# Patient Record
Sex: MALE | Race: WHITE | Employment: OTHER | URBAN - METROPOLITAN AREA
[De-identification: names, ages, dates, MRNs, and addresses within clinical notes are randomized per-mention and may not be internally consistent; named-entity substitution may affect disease eponyms.]

---

## 2020-05-19 PROBLEM — I25.118 CORONARY ARTERY DISEASE OF NATIVE ARTERY OF NATIVE HEART WITH STABLE ANGINA PECTORIS (HCC): Status: ACTIVE | Noted: 2020-05-19

## 2020-05-19 PROBLEM — I20.89 ANGINA OF EFFORT: Status: ACTIVE | Noted: 2020-05-19

## 2020-05-19 PROBLEM — I25.118 CORONARY ARTERY DISEASE OF NATIVE ARTERY OF NATIVE HEART WITH STABLE ANGINA PECTORIS: Status: ACTIVE | Noted: 2020-05-19

## 2020-05-19 PROBLEM — I20.8 ANGINA OF EFFORT (HCC): Status: ACTIVE | Noted: 2020-05-19

## 2020-05-19 PROBLEM — I20.89 ANGINA OF EFFORT (HCC): Status: ACTIVE | Noted: 2020-05-19

## 2020-05-23 ENCOUNTER — APPOINTMENT (OUTPATIENT)
Dept: LAB | Facility: HOSPITAL | Age: 80
End: 2020-05-23
Attending: INTERNAL MEDICINE
Payer: MEDICARE

## 2020-05-23 DIAGNOSIS — Z01.812 PRE-PROCEDURE LAB EXAM: Primary | ICD-10-CM

## 2020-05-23 DIAGNOSIS — Z01.818 PREOP TESTING: ICD-10-CM

## 2020-05-23 PROCEDURE — 85027 COMPLETE CBC AUTOMATED: CPT

## 2020-05-23 PROCEDURE — 80048 BASIC METABOLIC PNL TOTAL CA: CPT

## 2020-05-23 PROCEDURE — 36415 COLL VENOUS BLD VENIPUNCTURE: CPT

## 2020-05-25 RX ORDER — SODIUM CHLORIDE 9 MG/ML
INJECTION, SOLUTION INTRAVENOUS
Status: DISCONTINUED | OUTPATIENT
Start: 2020-05-25 | End: 2020-05-25

## 2020-05-26 ENCOUNTER — HOSPITAL ENCOUNTER (OUTPATIENT)
Dept: INTERVENTIONAL RADIOLOGY/VASCULAR | Facility: HOSPITAL | Age: 80
Discharge: HOME | End: 2020-05-26
Attending: INTERNAL MEDICINE | Admitting: INTERNAL MEDICINE
Payer: MEDICARE

## 2020-05-26 VITALS
WEIGHT: 184 LBS | OXYGEN SATURATION: 96 % | BODY MASS INDEX: 30 KG/M2 | DIASTOLIC BLOOD PRESSURE: 72 MMHG | RESPIRATION RATE: 19 BRPM | SYSTOLIC BLOOD PRESSURE: 176 MMHG | HEART RATE: 52 BPM

## 2020-05-26 DIAGNOSIS — Z01.818 PREOP TESTING: Primary | ICD-10-CM

## 2020-05-26 DIAGNOSIS — I20.0 UNSTABLE ANGINA (HCC): ICD-10-CM

## 2020-05-26 PROCEDURE — 36415 COLL VENOUS BLD VENIPUNCTURE: CPT

## 2020-05-26 PROCEDURE — 99152 MOD SED SAME PHYS/QHP 5/>YRS: CPT

## 2020-05-26 PROCEDURE — B2151ZZ FLUOROSCOPY OF LEFT HEART USING LOW OSMOLAR CONTRAST: ICD-10-PCS | Performed by: INTERNAL MEDICINE

## 2020-05-26 PROCEDURE — 93458 L HRT ARTERY/VENTRICLE ANGIO: CPT

## 2020-05-26 PROCEDURE — 027034Z DILATION OF CORONARY ARTERY, ONE ARTERY WITH DRUG-ELUTING INTRALUMINAL DEVICE, PERCUTANEOUS APPROACH: ICD-10-PCS | Performed by: INTERNAL MEDICINE

## 2020-05-26 PROCEDURE — B2111ZZ FLUOROSCOPY OF MULTIPLE CORONARY ARTERIES USING LOW OSMOLAR CONTRAST: ICD-10-PCS | Performed by: INTERNAL MEDICINE

## 2020-05-26 PROCEDURE — 4A023N7 MEASUREMENT OF CARDIAC SAMPLING AND PRESSURE, LEFT HEART, PERCUTANEOUS APPROACH: ICD-10-PCS | Performed by: INTERNAL MEDICINE

## 2020-05-26 PROCEDURE — 93010 ELECTROCARDIOGRAM REPORT: CPT | Performed by: INTERNAL MEDICINE

## 2020-05-26 PROCEDURE — 93005 ELECTROCARDIOGRAM TRACING: CPT

## 2020-05-26 PROCEDURE — 99153 MOD SED SAME PHYS/QHP EA: CPT

## 2020-05-26 PROCEDURE — 82962 GLUCOSE BLOOD TEST: CPT

## 2020-05-26 RX ORDER — CLOPIDOGREL BISULFATE 75 MG/1
75 TABLET ORAL DAILY
Qty: 30 TABLET | Refills: 11 | Status: SHIPPED | OUTPATIENT
Start: 2020-05-27 | End: 2021-05-17

## 2020-05-26 RX ORDER — LIDOCAINE HYDROCHLORIDE 20 MG/ML
INJECTION, SOLUTION EPIDURAL; INFILTRATION; INTRACAUDAL; PERINEURAL
Status: COMPLETED
Start: 2020-05-26 | End: 2020-05-26

## 2020-05-26 RX ORDER — AMLODIPINE BESYLATE 5 MG/1
5 TABLET ORAL ONCE
Status: COMPLETED | OUTPATIENT
Start: 2020-05-26 | End: 2020-05-26

## 2020-05-26 RX ORDER — LOSARTAN POTASSIUM 100 MG/1
TABLET ORAL
Status: COMPLETED
Start: 2020-05-26 | End: 2020-05-26

## 2020-05-26 RX ORDER — ASPIRIN 81 MG/1
81 TABLET ORAL DAILY
Qty: 30 TABLET | Refills: 0 | Status: SHIPPED | OUTPATIENT
Start: 2020-06-27 | End: 2020-06-02 | Stop reason: DRUGHIGH

## 2020-05-26 RX ORDER — ASPIRIN 81 MG/1
81 TABLET ORAL DAILY
Status: DISCONTINUED | OUTPATIENT
Start: 2020-05-27 | End: 2020-05-26

## 2020-05-26 RX ORDER — CLOPIDOGREL BISULFATE 75 MG/1
75 TABLET ORAL DAILY
Status: DISCONTINUED | OUTPATIENT
Start: 2020-05-27 | End: 2020-05-26

## 2020-05-26 RX ORDER — MIDAZOLAM HYDROCHLORIDE 1 MG/ML
INJECTION INTRAMUSCULAR; INTRAVENOUS
Status: COMPLETED
Start: 2020-05-26 | End: 2020-05-26

## 2020-05-26 RX ORDER — AMLODIPINE BESYLATE 5 MG/1
TABLET ORAL
Status: COMPLETED
Start: 2020-05-26 | End: 2020-05-26

## 2020-05-26 RX ORDER — SODIUM CHLORIDE 9 MG/ML
INJECTION, SOLUTION INTRAVENOUS CONTINUOUS
Status: ACTIVE | OUTPATIENT
Start: 2020-05-26 | End: 2020-05-26

## 2020-05-26 RX ORDER — CLOPIDOGREL BISULFATE 75 MG/1
TABLET ORAL
Status: COMPLETED
Start: 2020-05-26 | End: 2020-05-26

## 2020-05-26 RX ORDER — SODIUM CHLORIDE 9 MG/ML
1 INJECTION, SOLUTION INTRAVENOUS CONTINUOUS
Status: DISCONTINUED | OUTPATIENT
Start: 2020-05-26 | End: 2020-05-26

## 2020-05-26 RX ORDER — LOSARTAN POTASSIUM 100 MG/1
100 TABLET ORAL ONCE
Status: COMPLETED | OUTPATIENT
Start: 2020-05-26 | End: 2020-05-26

## 2020-05-26 RX ADMIN — LOSARTAN POTASSIUM 100 MG: 100 TABLET ORAL at 14:24:00

## 2020-05-26 RX ADMIN — AMLODIPINE BESYLATE 5 MG: 5 TABLET ORAL at 13:11:00

## 2020-05-26 RX ADMIN — SODIUM CHLORIDE 1 ML/KG/HR: 9 INJECTION, SOLUTION INTRAVENOUS at 06:30:00

## 2020-05-26 RX ADMIN — SODIUM CHLORIDE: 9 INJECTION, SOLUTION INTRAVENOUS at 09:00:00

## 2020-05-26 NOTE — PROGRESS NOTES
Outpatient Surgery Brief Discharge Summary         Patient ID:  Vamsi Post  [id number]  79year old  5/3/1940    Discharge Diagnoses: cad  Procedures: l,lv,cor; pci lad  Discharged Condition: stable    Disposition: home    Patient Instructions:  Tc Dent

## 2020-05-26 NOTE — CARDIAC REHAB
Cardiac Rehab Phase I    Activity:  Distance Bedrest post procedure  Assistance needed   Patient tolerated activity . Education:  Handouts provided and reviewed: [address] Diet: Healthy Cardiac diet reviewed.     Disease Proces

## 2020-05-26 NOTE — PROCEDURES
PCI of the LAD  Lesion Characteristics-not torturous, mild calcified. Type non-C lesion. Pre-intervention stenosis 75 %, Post intervention stenosis 0%. Pre KENYA 3, Post KENYA 3.    Guide-JL4  Wire-patient was premedicated with Angiomax.   A [employer] wire was a

## 2020-05-26 NOTE — DIETARY NOTE
NUTRITION EDUCATION NOTE    Received consult for nutrition education per cardiac rehab order set. Appropriate education completed with pt and pt's daughter and handout(s) provided. See education section of Epic for specifics.     Abdirashid Pride RD,LDN

## 2020-05-26 NOTE — PROCEDURES
[facility] Cardiac Cath Procedure Note    Luis Fernando Mckeon Patient Status:  Outpatient in a Bed    5/3/1940 MRN [id number]   Location [facility] Attending Trinity Keating MD   Hosp Day # 0 PCP Angela Ryan, projections. Pigtail catheter was placed for LV hemodynamics and LV angiogram was performed.     Specimen sent to: No specimen collected  Estimated blood loss: 10 cc  Closure: Mynx      IV was maintained by RN and moderate conscious sedation of 2 mg ve

## 2020-05-26 NOTE — PROGRESS NOTES
1400: Dr Louis Eddy at bedside. Updated on blood pressure situation. Ordered to give home dose of losartan. Administrated. OK for discharge. All parties verbalized understanding of plan of care via teach back method.  At discharge vital signs were stable on room a

## 2021-08-02 PROBLEM — Z01.818 PREOPERATIVE EVALUATION TO RULE OUT SURGICAL CONTRAINDICATION: Status: ACTIVE | Noted: 2021-08-02

## 2021-09-28 RX ORDER — ASPIRIN 81 MG/1
81 TABLET ORAL DAILY
Status: ON HOLD | COMMUNITY
End: 2021-10-18

## 2021-10-12 NOTE — H&P (VIEW-ONLY)
Patient ID: Latasha Nieves     Chief Complaint:    Patient presents with:  Pre-Op: pre op right total knee 10/18        HPI:    Latasha Nieves is a pleasant [de-identified]year old who presents today for evaluation of their right knee.   No acute falls, injur Transportation (Medical): Not on file      Lack of Transportation (Non-Medical):  Not on file  Physical Activity:       Days of Exercise per Week: Not on file      Minutes of Exercise per Session: Not on file  Stress:       Feeling of Stress : Not on file grind +  Sensation grossly intact to light tough throughout the lower extremity  Skin is intact  Distal pulses are 2+  No signs or symptoms of DVT    Exam of the contralateral knee is normal:  No muscle atrophy, erythema, ecchymosis, or gross deformity not Essie with right knee arthritis. At this time, they have failed a conservative treatment program. non steroid anti-infammatory medication(s), physical therapy, corticosteroid injection(s) and hyaluronic acid injection(s) are no longer working.   Their sym of this planned course of care, all their questions were answered and consent will be obtained preoperatively.       no smoking  yes diabetes A1C  6.8  BMI - 30  no hx of blood clots   no blood thinner  last injection - > 3 mo ago       - risks, benefits an

## 2021-10-15 ENCOUNTER — LAB ENCOUNTER (OUTPATIENT)
Dept: LAB | Age: 81
End: 2021-10-15
Attending: ORTHOPAEDIC SURGERY
Payer: MEDICARE

## 2021-10-15 DIAGNOSIS — M17.11 OSTEOARTHRITIS OF RIGHT KNEE: ICD-10-CM

## 2021-10-18 ENCOUNTER — ANESTHESIA (OUTPATIENT)
Dept: SURGERY | Facility: HOSPITAL | Age: 81
End: 2021-10-18
Payer: MEDICARE

## 2021-10-18 ENCOUNTER — HOSPITAL ENCOUNTER (OUTPATIENT)
Facility: HOSPITAL | Age: 81
Discharge: HOME HEALTH | End: 2021-10-19
Attending: ORTHOPAEDIC SURGERY | Admitting: ORTHOPAEDIC SURGERY
Payer: MEDICARE

## 2021-10-18 ENCOUNTER — ANESTHESIA EVENT (OUTPATIENT)
Dept: SURGERY | Facility: HOSPITAL | Age: 81
End: 2021-10-18
Payer: MEDICARE

## 2021-10-18 ENCOUNTER — APPOINTMENT (OUTPATIENT)
Dept: GENERAL RADIOLOGY | Facility: HOSPITAL | Age: 81
End: 2021-10-18
Attending: ORTHOPAEDIC SURGERY
Payer: MEDICARE

## 2021-10-18 DIAGNOSIS — M17.11 OSTEOARTHRITIS OF RIGHT KNEE: Primary | ICD-10-CM

## 2021-10-18 DIAGNOSIS — I25.118 CORONARY ARTERY DISEASE OF NATIVE ARTERY OF NATIVE HEART WITH STABLE ANGINA PECTORIS (HCC): ICD-10-CM

## 2021-10-18 DIAGNOSIS — M17.11 PRIMARY OSTEOARTHRITIS OF RIGHT KNEE: ICD-10-CM

## 2021-10-18 PROCEDURE — 88305 TISSUE EXAM BY PATHOLOGIST: CPT | Performed by: ORTHOPAEDIC SURGERY

## 2021-10-18 PROCEDURE — 3E0T3BZ INTRODUCTION OF ANESTHETIC AGENT INTO PERIPHERAL NERVES AND PLEXI, PERCUTANEOUS APPROACH: ICD-10-PCS | Performed by: ANESTHESIOLOGY

## 2021-10-18 PROCEDURE — 86850 RBC ANTIBODY SCREEN: CPT | Performed by: ORTHOPAEDIC SURGERY

## 2021-10-18 PROCEDURE — 73560 X-RAY EXAM OF KNEE 1 OR 2: CPT | Performed by: ORTHOPAEDIC SURGERY

## 2021-10-18 PROCEDURE — 82962 GLUCOSE BLOOD TEST: CPT

## 2021-10-18 PROCEDURE — 36410 VNPNXR 3YR/> PHY/QHP DX/THER: CPT | Performed by: ORTHOPAEDIC SURGERY

## 2021-10-18 PROCEDURE — 0SRC0J9 REPLACEMENT OF RIGHT KNEE JOINT WITH SYNTHETIC SUBSTITUTE, CEMENTED, OPEN APPROACH: ICD-10-PCS | Performed by: ORTHOPAEDIC SURGERY

## 2021-10-18 PROCEDURE — 3E0T33Z INTRODUCTION OF ANTI-INFLAMMATORY INTO PERIPHERAL NERVES AND PLEXI, PERCUTANEOUS APPROACH: ICD-10-PCS | Performed by: ANESTHESIOLOGY

## 2021-10-18 PROCEDURE — 76937 US GUIDE VASCULAR ACCESS: CPT | Performed by: ORTHOPAEDIC SURGERY

## 2021-10-18 PROCEDURE — 88311 DECALCIFY TISSUE: CPT | Performed by: ORTHOPAEDIC SURGERY

## 2021-10-18 PROCEDURE — 83036 HEMOGLOBIN GLYCOSYLATED A1C: CPT | Performed by: INTERNAL MEDICINE

## 2021-10-18 PROCEDURE — 86901 BLOOD TYPING SEROLOGIC RH(D): CPT | Performed by: ORTHOPAEDIC SURGERY

## 2021-10-18 PROCEDURE — 86900 BLOOD TYPING SEROLOGIC ABO: CPT | Performed by: ORTHOPAEDIC SURGERY

## 2021-10-18 PROCEDURE — 76942 ECHO GUIDE FOR BIOPSY: CPT | Performed by: ANESTHESIOLOGY

## 2021-10-18 DEVICE — IMPLANTABLE DEVICE
Type: IMPLANTABLE DEVICE | Site: KNEE | Status: FUNCTIONAL
Brand: REFOBACIN® BONE CEMENT R

## 2021-10-18 DEVICE — PSN TIB STM 5 DEG SZ F R: Type: IMPLANTABLE DEVICE | Site: KNEE | Status: FUNCTIONAL

## 2021-10-18 DEVICE — PSN MC VE ASF R 13MM 8-11 EF: Type: IMPLANTABLE DEVICE | Site: KNEE | Status: FUNCTIONAL

## 2021-10-18 DEVICE — PSN FEM CR CMT CCR STD SZ10 R: Type: IMPLANTABLE DEVICE | Site: KNEE | Status: FUNCTIONAL

## 2021-10-18 DEVICE — PSN ALL POLY PAT PLY 32MM: Type: IMPLANTABLE DEVICE | Site: KNEE | Status: FUNCTIONAL

## 2021-10-18 RX ORDER — HYDROCODONE BITARTRATE AND ACETAMINOPHEN 5; 325 MG/1; MG/1
1 TABLET ORAL AS NEEDED
Status: DISCONTINUED | OUTPATIENT
Start: 2021-10-18 | End: 2021-10-18 | Stop reason: HOSPADM

## 2021-10-18 RX ORDER — HYDROCODONE BITARTRATE AND ACETAMINOPHEN 5; 325 MG/1; MG/1
2 TABLET ORAL AS NEEDED
Status: DISCONTINUED | OUTPATIENT
Start: 2021-10-18 | End: 2021-10-18 | Stop reason: HOSPADM

## 2021-10-18 RX ORDER — AMLODIPINE BESYLATE 5 MG/1
5 TABLET ORAL DAILY
Status: DISCONTINUED | OUTPATIENT
Start: 2021-10-18 | End: 2021-10-18

## 2021-10-18 RX ORDER — TRANEXAMIC ACID 10 MG/ML
1000 INJECTION, SOLUTION INTRAVENOUS ONCE
Status: COMPLETED | OUTPATIENT
Start: 2021-10-18 | End: 2021-10-18

## 2021-10-18 RX ORDER — MIDAZOLAM HYDROCHLORIDE 1 MG/ML
INJECTION INTRAMUSCULAR; INTRAVENOUS AS NEEDED
Status: DISCONTINUED | OUTPATIENT
Start: 2021-10-18 | End: 2021-10-18 | Stop reason: SURG

## 2021-10-18 RX ORDER — SODIUM CHLORIDE, SODIUM LACTATE, POTASSIUM CHLORIDE, CALCIUM CHLORIDE 600; 310; 30; 20 MG/100ML; MG/100ML; MG/100ML; MG/100ML
INJECTION, SOLUTION INTRAVENOUS CONTINUOUS
Status: DISCONTINUED | OUTPATIENT
Start: 2021-10-18 | End: 2021-10-18 | Stop reason: HOSPADM

## 2021-10-18 RX ORDER — PIOGLITAZONEHYDROCHLORIDE 15 MG/1
15 TABLET ORAL DAILY
COMMUNITY

## 2021-10-18 RX ORDER — POLYETHYLENE GLYCOL 3350 17 G/17G
17 POWDER, FOR SOLUTION ORAL DAILY PRN
Status: DISCONTINUED | OUTPATIENT
Start: 2021-10-18 | End: 2021-10-19

## 2021-10-18 RX ORDER — BUPIVACAINE HYDROCHLORIDE 2.5 MG/ML
INJECTION, SOLUTION EPIDURAL; INFILTRATION; INTRACAUDAL AS NEEDED
Status: DISCONTINUED | OUTPATIENT
Start: 2021-10-18 | End: 2021-10-18 | Stop reason: SURG

## 2021-10-18 RX ORDER — ACETAMINOPHEN 500 MG
1000 TABLET ORAL ONCE
Status: DISCONTINUED | OUTPATIENT
Start: 2021-10-18 | End: 2021-10-18

## 2021-10-18 RX ORDER — HYDROMORPHONE HYDROCHLORIDE 1 MG/ML
0.2 INJECTION, SOLUTION INTRAMUSCULAR; INTRAVENOUS; SUBCUTANEOUS EVERY 2 HOUR PRN
Status: DISCONTINUED | OUTPATIENT
Start: 2021-10-18 | End: 2021-10-19

## 2021-10-18 RX ORDER — NALOXONE HYDROCHLORIDE 0.4 MG/ML
80 INJECTION, SOLUTION INTRAMUSCULAR; INTRAVENOUS; SUBCUTANEOUS AS NEEDED
Status: DISCONTINUED | OUTPATIENT
Start: 2021-10-18 | End: 2021-10-18 | Stop reason: HOSPADM

## 2021-10-18 RX ORDER — PREGABALIN 75 MG/1
75 CAPSULE ORAL DAILY
Status: DISCONTINUED | OUTPATIENT
Start: 2021-10-19 | End: 2021-10-19

## 2021-10-18 RX ORDER — SODIUM CHLORIDE, SODIUM LACTATE, POTASSIUM CHLORIDE, CALCIUM CHLORIDE 600; 310; 30; 20 MG/100ML; MG/100ML; MG/100ML; MG/100ML
INJECTION, SOLUTION INTRAVENOUS CONTINUOUS
Status: DISCONTINUED | OUTPATIENT
Start: 2021-10-18 | End: 2021-10-19

## 2021-10-18 RX ORDER — BUPIVACAINE HYDROCHLORIDE 7.5 MG/ML
INJECTION, SOLUTION INTRASPINAL AS NEEDED
Status: DISCONTINUED | OUTPATIENT
Start: 2021-10-18 | End: 2021-10-18 | Stop reason: SURG

## 2021-10-18 RX ORDER — ATORVASTATIN CALCIUM 40 MG/1
80 TABLET, FILM COATED ORAL NIGHTLY
Status: DISCONTINUED | OUTPATIENT
Start: 2021-10-18 | End: 2021-10-19

## 2021-10-18 RX ORDER — ASPIRIN 81 MG/1
81 TABLET ORAL 2 TIMES DAILY
Status: DISCONTINUED | OUTPATIENT
Start: 2021-10-18 | End: 2021-10-19

## 2021-10-18 RX ORDER — ACETAMINOPHEN 325 MG/1
650 TABLET ORAL 4 TIMES DAILY
Status: DISCONTINUED | OUTPATIENT
Start: 2021-10-18 | End: 2021-10-19

## 2021-10-18 RX ORDER — HYDROMORPHONE HYDROCHLORIDE 1 MG/ML
INJECTION, SOLUTION INTRAMUSCULAR; INTRAVENOUS; SUBCUTANEOUS
Status: COMPLETED
Start: 2021-10-18 | End: 2021-10-18

## 2021-10-18 RX ORDER — DIPHENHYDRAMINE HYDROCHLORIDE 50 MG/ML
25 INJECTION INTRAMUSCULAR; INTRAVENOUS ONCE AS NEEDED
Status: ACTIVE | OUTPATIENT
Start: 2021-10-18 | End: 2021-10-18

## 2021-10-18 RX ORDER — DEXAMETHASONE SODIUM PHOSPHATE 10 MG/ML
INJECTION, SOLUTION INTRAMUSCULAR; INTRAVENOUS AS NEEDED
Status: DISCONTINUED | OUTPATIENT
Start: 2021-10-18 | End: 2021-10-18 | Stop reason: SURG

## 2021-10-18 RX ORDER — SODIUM PHOSPHATE, DIBASIC AND SODIUM PHOSPHATE, MONOBASIC 7; 19 G/133ML; G/133ML
1 ENEMA RECTAL ONCE AS NEEDED
Status: DISCONTINUED | OUTPATIENT
Start: 2021-10-18 | End: 2021-10-19

## 2021-10-18 RX ORDER — CEFAZOLIN SODIUM/WATER 2 G/20 ML
2 SYRINGE (ML) INTRAVENOUS EVERY 8 HOURS
Status: COMPLETED | OUTPATIENT
Start: 2021-10-18 | End: 2021-10-19

## 2021-10-18 RX ORDER — ONDANSETRON 2 MG/ML
4 INJECTION INTRAMUSCULAR; INTRAVENOUS EVERY 4 HOURS PRN
Status: DISCONTINUED | OUTPATIENT
Start: 2021-10-18 | End: 2021-10-19

## 2021-10-18 RX ORDER — ONDANSETRON 2 MG/ML
INJECTION INTRAMUSCULAR; INTRAVENOUS AS NEEDED
Status: DISCONTINUED | OUTPATIENT
Start: 2021-10-18 | End: 2021-10-18 | Stop reason: SURG

## 2021-10-18 RX ORDER — MELATONIN
325
Status: DISCONTINUED | OUTPATIENT
Start: 2021-10-19 | End: 2021-10-19

## 2021-10-18 RX ORDER — ACETAMINOPHEN 325 MG/1
650 TABLET ORAL ONCE
Status: COMPLETED | OUTPATIENT
Start: 2021-10-18 | End: 2021-10-18

## 2021-10-18 RX ORDER — DIPHENHYDRAMINE HCL 25 MG
25 CAPSULE ORAL EVERY 4 HOURS PRN
Status: DISCONTINUED | OUTPATIENT
Start: 2021-10-18 | End: 2021-10-19

## 2021-10-18 RX ORDER — DOCUSATE SODIUM 100 MG/1
100 CAPSULE, LIQUID FILLED ORAL 2 TIMES DAILY
Status: DISCONTINUED | OUTPATIENT
Start: 2021-10-18 | End: 2021-10-19

## 2021-10-18 RX ORDER — AMLODIPINE BESYLATE 5 MG/1
5 TABLET ORAL DAILY
Status: DISCONTINUED | OUTPATIENT
Start: 2021-10-19 | End: 2021-10-19

## 2021-10-18 RX ORDER — DIPHENHYDRAMINE HYDROCHLORIDE 50 MG/ML
12.5 INJECTION INTRAMUSCULAR; INTRAVENOUS EVERY 4 HOURS PRN
Status: DISCONTINUED | OUTPATIENT
Start: 2021-10-18 | End: 2021-10-19

## 2021-10-18 RX ORDER — CEFAZOLIN SODIUM/WATER 2 G/20 ML
2 SYRINGE (ML) INTRAVENOUS ONCE
Status: COMPLETED | OUTPATIENT
Start: 2021-10-18 | End: 2021-10-18

## 2021-10-18 RX ORDER — DEXTROSE MONOHYDRATE 25 G/50ML
50 INJECTION, SOLUTION INTRAVENOUS
Status: DISCONTINUED | OUTPATIENT
Start: 2021-10-18 | End: 2021-10-18 | Stop reason: HOSPADM

## 2021-10-18 RX ORDER — BISACODYL 10 MG
10 SUPPOSITORY, RECTAL RECTAL
Status: DISCONTINUED | OUTPATIENT
Start: 2021-10-18 | End: 2021-10-19

## 2021-10-18 RX ORDER — DEXAMETHASONE SODIUM PHOSPHATE 10 MG/ML
8 INJECTION, SOLUTION INTRAMUSCULAR; INTRAVENOUS ONCE
Status: COMPLETED | OUTPATIENT
Start: 2021-10-19 | End: 2021-10-19

## 2021-10-18 RX ORDER — ACETAMINOPHEN 325 MG/1
TABLET ORAL
Status: COMPLETED
Start: 2021-10-18 | End: 2021-10-18

## 2021-10-18 RX ORDER — DEXAMETHASONE SODIUM PHOSPHATE 4 MG/ML
VIAL (ML) INJECTION AS NEEDED
Status: DISCONTINUED | OUTPATIENT
Start: 2021-10-18 | End: 2021-10-18 | Stop reason: SURG

## 2021-10-18 RX ORDER — LOSARTAN POTASSIUM 100 MG/1
100 TABLET ORAL DAILY
Status: DISCONTINUED | OUTPATIENT
Start: 2021-10-19 | End: 2021-10-19

## 2021-10-18 RX ORDER — HYDROMORPHONE HYDROCHLORIDE 1 MG/ML
0.4 INJECTION, SOLUTION INTRAMUSCULAR; INTRAVENOUS; SUBCUTANEOUS EVERY 2 HOUR PRN
Status: DISCONTINUED | OUTPATIENT
Start: 2021-10-18 | End: 2021-10-19

## 2021-10-18 RX ORDER — SODIUM CHLORIDE 9 MG/ML
INJECTION, SOLUTION INTRAVENOUS CONTINUOUS
Status: DISCONTINUED | OUTPATIENT
Start: 2021-10-18 | End: 2021-10-19

## 2021-10-18 RX ORDER — LIDOCAINE HYDROCHLORIDE 10 MG/ML
INJECTION, SOLUTION EPIDURAL; INFILTRATION; INTRACAUDAL; PERINEURAL AS NEEDED
Status: DISCONTINUED | OUTPATIENT
Start: 2021-10-18 | End: 2021-10-18 | Stop reason: SURG

## 2021-10-18 RX ORDER — OXYCODONE HYDROCHLORIDE 5 MG/1
2.5 TABLET ORAL EVERY 4 HOURS PRN
Status: DISCONTINUED | OUTPATIENT
Start: 2021-10-18 | End: 2021-10-19

## 2021-10-18 RX ORDER — AMLODIPINE BESYLATE 5 MG/1
5 TABLET ORAL ONCE
Status: COMPLETED | OUTPATIENT
Start: 2021-10-18 | End: 2021-10-18

## 2021-10-18 RX ORDER — FERROUS SULFATE TAB EC 324 MG (65 MG FE EQUIVALENT) 324 (65 FE) MG
1 TABLET DELAYED RESPONSE ORAL DAILY
COMMUNITY

## 2021-10-18 RX ORDER — PROCHLORPERAZINE EDISYLATE 5 MG/ML
10 INJECTION INTRAMUSCULAR; INTRAVENOUS EVERY 6 HOURS PRN
Status: DISCONTINUED | OUTPATIENT
Start: 2021-10-18 | End: 2021-10-19

## 2021-10-18 RX ORDER — TRAMADOL HYDROCHLORIDE 50 MG/1
50 TABLET ORAL EVERY 12 HOURS
Status: DISCONTINUED | OUTPATIENT
Start: 2021-10-18 | End: 2021-10-19

## 2021-10-18 RX ORDER — OXYCODONE HYDROCHLORIDE 5 MG/1
5 TABLET ORAL EVERY 4 HOURS PRN
Status: DISCONTINUED | OUTPATIENT
Start: 2021-10-18 | End: 2021-10-19

## 2021-10-18 RX ORDER — SENNOSIDES 8.6 MG
17.2 TABLET ORAL NIGHTLY
Status: DISCONTINUED | OUTPATIENT
Start: 2021-10-18 | End: 2021-10-19

## 2021-10-18 RX ORDER — HYDROMORPHONE HYDROCHLORIDE 1 MG/ML
0.4 INJECTION, SOLUTION INTRAMUSCULAR; INTRAVENOUS; SUBCUTANEOUS EVERY 5 MIN PRN
Status: DISCONTINUED | OUTPATIENT
Start: 2021-10-18 | End: 2021-10-18 | Stop reason: HOSPADM

## 2021-10-18 RX ORDER — DEXTROSE MONOHYDRATE 25 G/50ML
50 INJECTION, SOLUTION INTRAVENOUS
Status: DISCONTINUED | OUTPATIENT
Start: 2021-10-18 | End: 2021-10-19

## 2021-10-18 RX ADMIN — LIDOCAINE HYDROCHLORIDE 25 MG: 10 INJECTION, SOLUTION EPIDURAL; INFILTRATION; INTRACAUDAL; PERINEURAL at 14:23:00

## 2021-10-18 RX ADMIN — ONDANSETRON 4 MG: 2 INJECTION INTRAMUSCULAR; INTRAVENOUS at 14:29:00

## 2021-10-18 RX ADMIN — SODIUM CHLORIDE, SODIUM LACTATE, POTASSIUM CHLORIDE, CALCIUM CHLORIDE: 600; 310; 30; 20 INJECTION, SOLUTION INTRAVENOUS at 14:12:00

## 2021-10-18 RX ADMIN — BUPIVACAINE HYDROCHLORIDE 1.6 ML: 7.5 INJECTION, SOLUTION INTRASPINAL at 14:18:00

## 2021-10-18 RX ADMIN — CEFAZOLIN SODIUM/WATER 2 G: 2 G/20 ML SYRINGE (ML) INTRAVENOUS at 14:26:00

## 2021-10-18 RX ADMIN — DEXAMETHASONE SODIUM PHOSPHATE 2 MG: 10 INJECTION, SOLUTION INTRAMUSCULAR; INTRAVENOUS at 14:21:00

## 2021-10-18 RX ADMIN — BUPIVACAINE HYDROCHLORIDE 20 ML: 2.5 INJECTION, SOLUTION EPIDURAL; INFILTRATION; INTRACAUDAL at 14:21:00

## 2021-10-18 RX ADMIN — TRANEXAMIC ACID 1000 MG: 10 INJECTION, SOLUTION INTRAVENOUS at 14:25:00

## 2021-10-18 RX ADMIN — MIDAZOLAM HYDROCHLORIDE 2 MG: 1 INJECTION INTRAMUSCULAR; INTRAVENOUS at 14:12:00

## 2021-10-18 RX ADMIN — DEXAMETHASONE SODIUM PHOSPHATE 8 MG: 4 MG/ML VIAL (ML) INJECTION at 14:29:00

## 2021-10-18 NOTE — ANESTHESIA PROCEDURE NOTES
Spinal Block  Performed by: Mildred Dietz MD  Authorized by: Mildred Dietz MD       General Information and Staff    Start Time:  10/18/2021 2:18 PM  End Time:  10/18/2021 2:20 PM  Anesthesiologist:  Mildred Dietz MD  Performed by:   Anesthes

## 2021-10-18 NOTE — OPERATIVE REPORT
OPERATIVE REPORT    Facility: [facility]  Patient name: Daija Mishra  : 5/3/1940        Medical record: [id number]  Date of procedure: 2021  Pre-operative diagnosis: Right knee end-stage degenerative joint disease  Post-operative quadriceps pain/dysfunction post operatively. A surgical timeout was held verifying the site, procedure, and that pre-operative antibiotics had been given.   A longitudinal incision was over the anterior aspect of the knee exposing the extensor mechanis this time, the tibial cut was checked with the spacer block and the alignment ranjith. The distal end of the femur was sculptured with the notch and chamfer cutting guide using an oscillating saw.  The tibial fixation hole was created with the tibial templat lavage and antibiotic solution followed by normal saline. The arthrotomy was closed with #1 stratafix barbed suture. The subcutaneous tissue was closed in layers with # 0 and # 2-0 Vicryl interrupted sutures.  The skin was closed with 3-0 stratfix barbe

## 2021-10-18 NOTE — HOME CARE LIAISON
Pt was set up preoperatively with [facility]. Will continue to follow. CONY/Sunita fletcher.

## 2021-10-18 NOTE — INTERVAL H&P NOTE
Pre-op Diagnosis: Primary osteoarthritis of right knee [M17.11]    The above referenced H&P was reviewed by Lang Mohr MD on 10/18/2021, the patient was examined and no significant changes have occurred in the patient's condition since the H&P was perf

## 2021-10-18 NOTE — ANESTHESIA PROCEDURE NOTES
Regional Block  Performed by: Cecilia Perez MD  Authorized by: Cecilia Perez MD       General Information and Staff    Start Time:  10/18/2021 2:21 PM  End Time:  10/18/2021 2:22 PM  Anesthesiologist:  Cecilia Perez MD  Performed by:   Anesth

## 2021-10-18 NOTE — PLAN OF CARE
Pt A & O x4, [facility] speaking. On 2L oxygen, /IS. Teds/SCDs. Regular diet, Accu checks ACHS. WBAT. PT/OT eval tomorrow, IVF. Last BM 10/18. Tele-SB. Aquacel dressing CDI, spandagrip, ice therapy. Daughter at bedside, translating for pt.   Oxy PRN

## 2021-10-18 NOTE — ANESTHESIA PREPROCEDURE EVALUATION
PRE-OP EVALUATION    Patient Name: Vamsi Post    Admit Diagnosis: Primary osteoarthritis of right knee [M17.11]    Pre-op Diagnosis: Primary osteoarthritis of right knee [M17.11]    RIGHT TOTAL KNEE ARTHROPLASTY     Anesthesia Procedure: RIGHT TOTAL Tab, Take 2 mg by mouth daily with breakfast., Disp: , Rfl: , 10/17/2021 at 0800  amLODIPine Besylate 5 MG Oral Tab, Take 5 mg by mouth daily. , Disp: , Rfl: , 10/18/2021 at 0500  hydrochlorothiazide 25 MG Oral Tab, Take 25 mg by mouth daily. , Disp: , Rfl: GI/Hepatic/Renal    Negative GI/hepatic/renal ROS. Cardiovascular    Negative cardiovascular ROS.     Exercise tolerance: good     MET: >4      (+) hypertension     (+) CAD    (+) CABG/stent                            Endo/Other

## 2021-10-19 VITALS
TEMPERATURE: 97 F | HEIGHT: 66 IN | OXYGEN SATURATION: 92 % | BODY MASS INDEX: 29.58 KG/M2 | WEIGHT: 184.06 LBS | SYSTOLIC BLOOD PRESSURE: 143 MMHG | RESPIRATION RATE: 18 BRPM | DIASTOLIC BLOOD PRESSURE: 57 MMHG | HEART RATE: 68 BPM

## 2021-10-19 PROCEDURE — 82962 GLUCOSE BLOOD TEST: CPT

## 2021-10-19 PROCEDURE — 97161 PT EVAL LOW COMPLEX 20 MIN: CPT

## 2021-10-19 PROCEDURE — 97116 GAIT TRAINING THERAPY: CPT

## 2021-10-19 PROCEDURE — 97535 SELF CARE MNGMENT TRAINING: CPT

## 2021-10-19 PROCEDURE — 97165 OT EVAL LOW COMPLEX 30 MIN: CPT

## 2021-10-19 RX ORDER — AMLODIPINE BESYLATE 5 MG/1
10 TABLET ORAL DAILY
Qty: 30 TABLET | Refills: 0 | Status: SHIPPED | OUTPATIENT
Start: 2021-10-19

## 2021-10-19 RX ORDER — AMLODIPINE BESYLATE 5 MG/1
5 TABLET ORAL ONCE
Status: COMPLETED | OUTPATIENT
Start: 2021-10-19 | End: 2021-10-19

## 2021-10-19 NOTE — PHYSICAL THERAPY NOTE
PHYSICAL THERAPY KNEE EVALUATION - INPATIENT     Room Number: 376/376-A  Evaluation Date: 10/19/2021  Type of Evaluation: Initial  Physician Order: PT Eval and Treat    Presenting Problem: s/p R TKR  Reason for Therapy: Mobility Dysfunction and Discharge P ROM is within functional limits R knee 15-85.     Lower extremity strength is within functional limits R knee 3-/5 throughout    BALANCE  Static Sitting: Good  Dynamic Sitting: Fair +  Static Standing: Fair  Dynamic Standing: Fair - of session/findings; All patient questions and concerns addressed; Ice applied; Family present    ASSESSMENT   Patient is a [de-identified]year old male admitted on 10/18/2021 for R TKR. Pertinent comorbidities and personal factors impacting therapy include see PMHx.   I No

## 2021-10-19 NOTE — PLAN OF CARE
Pt a&O. On room air. Encouraged use of incentive spirometer and ankle pump exercises every hour while awake. Scds to BLE. Spand- to RLE. Tolerating diet with good appetite. Blood sugars monitored and insulin given. Last BM 10/18/21.  Miralax given this

## 2021-10-19 NOTE — PLAN OF CARE
Aox4, [place] w/o HA's, denying numbness and tingling at this time, pain controlled with PO medications, aquacel and spandigrip c/d/I, on 2L O2, voided, scripts at home, IVF infusing as ordered, on Aspirin, fall precautions maintained, will continue to monitor

## 2021-10-19 NOTE — OCCUPATIONAL THERAPY NOTE
OCCUPATIONAL THERAPY EVALUATION - INPATIENT     Room Number: 376/376-A  Evaluation Date: 10/19/2021  Type of Evaluation: Initial  Presenting Problem: s/p R TKR on 10/18     Physician Order: IP Consult to Occupational Therapy  Reason for Therapy: ADL/IADL D promotion;Relaxation;Repositioning; Other (Comment) (ice )    COGNITION  Safety Judgement:  decreased awareness of need for assistance and decreased awareness of need for safety   Increased time for processing even with translation     VISION  Current Visio aspects of toileting including annalise-care and clothing management with min assist. Pt performed functional mobility with RW and CG assist to chair with CG assist for stand>sit. Pt was left with call light, telephone and personal items within reach.  All ques training;Patient/Family education;Patient/Family training;Equipment eval/education; Compensatory technique education;Continued evaluation  Rehab Potential : Good  Frequency (Obs): Daily  Number of Visits to Meet Established Goals: 2    ADL Goals   Patient w

## 2021-10-19 NOTE — CM/SW NOTE
10/19/21 1400   CM/SW Referral Data   Referral Source Social Work (self-referral)   Reason for Referral Discharge planning   Informant Clinical Staff Member;EMR   Discharge Needs   Anticipated D/C needs Home health care       HOME SITUATION  Type of Mj

## 2021-10-19 NOTE — PROGRESS NOTES
Daughter at bedside and interpreting into [facility] per patient request. Reviewed indications, side effects of pain medication/narcotics and constipation prevention.  Stressed importance of increased fluids/roughage in diet, continued use of stool softeners a

## 2021-10-19 NOTE — PLAN OF CARE
A&O x4, [place] speaking. VSS on 2L O2 per nc. Pain controlled with PO medication. WBAT. Voids freely per urinal. Bilateral SCDs. Aquacel dressing C/D/I, gel ice applied. Spandagrip in place. CMS intact. Reviewed POC with pt who agrees. Rx at home.  Bed ala

## 2021-10-19 NOTE — PROGRESS NOTES
Progress Note    Patient: Sara Anderson  Medical record #: [id number]    Sara Anderson is a [de-identified]year old  male who is POD #1 right TKA. The patient's main complaint today is surgical pain at the operative site. No numbness or tingling.  No chest pain PRN  HYDROmorphone HCl (DILAUDID) 1 MG/ML injection 0.2 mg, 0.2 mg, Intravenous, Q2H PRN   Or  HYDROmorphone HCl (DILAUDID) 1 MG/ML injection 0.4 mg, 0.4 mg, Intravenous, Q2H PRN  tiZANidine HCl (ZANAFLEX) partial tablet 1 mg, 1 mg, Oral, Q6H PRN  dexameth and dry  Able to dorsiflex ankle and move toes  Sensation grossly intact to light touch throughout  Distal pulses intact  No calf swelling  Luis's sign negative  Compartments soft  No signs or symptoms of DVT or compartment syndrome    Labs:          [place]se

## 2021-10-19 NOTE — CONSULTS
[facility] Hospitalist Initial Consult       Reason for consult: Medical Management sp TKA      History of Present Illness: Patient is a [de-identified]year old male with PMH sig for CAD, DM2,HTN, HL, OA who presents sp tka.    They tolerated the procedure well without any im masses,  No organomegaly. Non distended   Extremities: Dressing c/d/i. Skin: Skin color, texture, turgor normal. No rashes or lesions.     Neurologic: Normal strength, no focal deficit appreciated     Laboratory:  No results for input(s): WBC, HGB, MCV,

## 2021-10-29 PROBLEM — Z96.651 STATUS POST TOTAL KNEE REPLACEMENT, RIGHT: Status: ACTIVE | Noted: 2021-10-29

## 2022-12-17 ENCOUNTER — HOSPITAL ENCOUNTER (INPATIENT)
Facility: HOSPITAL | Age: 82
LOS: 2 days | Discharge: HOME | End: 2022-12-19
Attending: EMERGENCY MEDICINE | Admitting: HOSPITALIST
Payer: MEDICARE

## 2022-12-17 ENCOUNTER — APPOINTMENT (OUTPATIENT)
Dept: GENERAL RADIOLOGY | Facility: HOSPITAL | Age: 82
End: 2022-12-17
Attending: EMERGENCY MEDICINE
Payer: MEDICARE

## 2022-12-17 DIAGNOSIS — U07.1 COVID-19: Primary | ICD-10-CM

## 2022-12-17 DIAGNOSIS — R41.0 CONFUSION: ICD-10-CM

## 2022-12-17 LAB
ANION GAP SERPL CALC-SCNC: 8 MMOL/L (ref 0–18)
BASOPHILS # BLD AUTO: 0.02 X10(3) UL (ref 0–0.2)
BASOPHILS NFR BLD AUTO: 0.5 %
BILIRUB UR QL: NEGATIVE
BUN BLD-MCNC: 28 MG/DL (ref 7–18)
BUN/CREAT SERPL: 23.7 (ref 10–20)
CALCIUM BLD-MCNC: 8.9 MG/DL (ref 8.5–10.1)
CHLORIDE SERPL-SCNC: 102 MMOL/L (ref 98–112)
CK SERPL-CCNC: 140 U/L
CLARITY UR: CLEAR
CO2 SERPL-SCNC: 28 MMOL/L (ref 21–32)
COLOR UR: YELLOW
CREAT BLD-MCNC: 1.18 MG/DL
D DIMER PPP FEU-MCNC: 0.69 UG/ML FEU (ref ?–0.81)
DEPRECATED RDW RBC AUTO: 51.8 FL (ref 35.1–46.3)
EOSINOPHIL # BLD AUTO: 0.01 X10(3) UL (ref 0–0.7)
EOSINOPHIL NFR BLD AUTO: 0.2 %
ERYTHROCYTE [DISTWIDTH] IN BLOOD BY AUTOMATED COUNT: 14.7 % (ref 11–15)
FLUAV + FLUBV RNA SPEC NAA+PROBE: NEGATIVE
FLUAV + FLUBV RNA SPEC NAA+PROBE: NEGATIVE
GFR SERPLBLD BASED ON 1.73 SQ M-ARVRAT: 62 ML/MIN/1.73M2 (ref 60–?)
GLUCOSE BLD-MCNC: 161 MG/DL (ref 70–99)
GLUCOSE BLDC GLUCOMTR-MCNC: 124 MG/DL (ref 70–99)
GLUCOSE UR-MCNC: 50 MG/DL
HCT VFR BLD AUTO: 35.4 %
HGB BLD-MCNC: 11.8 G/DL
HYALINE CASTS #/AREA URNS AUTO: PRESENT /LPF
IMM GRANULOCYTES # BLD AUTO: 0.01 X10(3) UL (ref 0–1)
IMM GRANULOCYTES NFR BLD: 0.2 %
KETONES UR-MCNC: NEGATIVE MG/DL
LEUKOCYTE ESTERASE UR QL STRIP.AUTO: NEGATIVE
LYMPHOCYTES # BLD AUTO: 0.55 X10(3) UL (ref 1–4)
LYMPHOCYTES NFR BLD AUTO: 13.2 %
MCH RBC QN AUTO: 31.3 PG (ref 26–34)
MCHC RBC AUTO-ENTMCNC: 33.3 G/DL (ref 31–37)
MCV RBC AUTO: 93.9 FL
MONOCYTES # BLD AUTO: 0.77 X10(3) UL (ref 0.1–1)
MONOCYTES NFR BLD AUTO: 18.4 %
NEUTROPHILS # BLD AUTO: 2.82 X10 (3) UL (ref 1.5–7.7)
NEUTROPHILS # BLD AUTO: 2.82 X10(3) UL (ref 1.5–7.7)
NEUTROPHILS NFR BLD AUTO: 67.5 %
NITRITE UR QL STRIP.AUTO: NEGATIVE
NT-PROBNP SERPL-MCNC: 1586 PG/ML (ref ?–450)
OSMOLALITY SERPL CALC.SUM OF ELEC: 295 MOSM/KG (ref 275–295)
PH UR: 5 [PH] (ref 5–8)
PLATELET # BLD AUTO: 129 10(3)UL (ref 150–450)
POTASSIUM SERPL-SCNC: 3.4 MMOL/L (ref 3.5–5.1)
PROCALCITONIN SERPL-MCNC: 0.04 NG/ML (ref ?–0.16)
PROT UR-MCNC: 30 MG/DL
RBC # BLD AUTO: 3.77 X10(6)UL
RSV RNA SPEC NAA+PROBE: NEGATIVE
SARS-COV-2 RNA RESP QL NAA+PROBE: DETECTED
SODIUM SERPL-SCNC: 138 MMOL/L (ref 136–145)
SP GR UR STRIP: 1.02 (ref 1–1.03)
UROBILINOGEN UR STRIP-ACNC: <2
VIT C UR-MCNC: NEGATIVE MG/DL
WBC # BLD AUTO: 4.2 X10(3) UL (ref 4–11)

## 2022-12-17 PROCEDURE — 71045 X-RAY EXAM CHEST 1 VIEW: CPT | Performed by: EMERGENCY MEDICINE

## 2022-12-17 RX ORDER — POTASSIUM CHLORIDE 20 MEQ/1
40 TABLET, EXTENDED RELEASE ORAL ONCE
Status: COMPLETED | OUTPATIENT
Start: 2022-12-17 | End: 2022-12-17

## 2022-12-17 RX ORDER — NICOTINE POLACRILEX 4 MG
30 LOZENGE BUCCAL
Status: DISCONTINUED | OUTPATIENT
Start: 2022-12-17 | End: 2022-12-19

## 2022-12-17 RX ORDER — DEXTROSE MONOHYDRATE 25 G/50ML
50 INJECTION, SOLUTION INTRAVENOUS
Status: DISCONTINUED | OUTPATIENT
Start: 2022-12-17 | End: 2022-12-19

## 2022-12-17 RX ORDER — ENOXAPARIN SODIUM 100 MG/ML
40 INJECTION SUBCUTANEOUS DAILY
Status: DISCONTINUED | OUTPATIENT
Start: 2022-12-18 | End: 2022-12-19

## 2022-12-17 RX ORDER — GUAIFENESIN 600 MG/1
600 TABLET, EXTENDED RELEASE ORAL 2 TIMES DAILY
Status: DISCONTINUED | OUTPATIENT
Start: 2022-12-17 | End: 2022-12-19

## 2022-12-17 RX ORDER — NICOTINE POLACRILEX 4 MG
15 LOZENGE BUCCAL
Status: DISCONTINUED | OUTPATIENT
Start: 2022-12-17 | End: 2022-12-19

## 2022-12-17 RX ORDER — BENZONATATE 100 MG/1
200 CAPSULE ORAL 3 TIMES DAILY PRN
Status: DISCONTINUED | OUTPATIENT
Start: 2022-12-17 | End: 2022-12-19

## 2022-12-17 RX ORDER — ALBUTEROL SULFATE 90 UG/1
4 AEROSOL, METERED RESPIRATORY (INHALATION) EVERY 4 HOURS PRN
Status: DISCONTINUED | OUTPATIENT
Start: 2022-12-17 | End: 2022-12-19

## 2022-12-17 RX ORDER — ACETAMINOPHEN 500 MG
1000 TABLET ORAL ONCE
Status: COMPLETED | OUTPATIENT
Start: 2022-12-17 | End: 2022-12-17

## 2022-12-17 RX ORDER — ASPIRIN 81 MG/1
1 TABLET ORAL 2 TIMES DAILY
COMMUNITY
Start: 2021-10-19

## 2022-12-17 RX ORDER — ONDANSETRON 2 MG/ML
4 INJECTION INTRAMUSCULAR; INTRAVENOUS EVERY 6 HOURS PRN
Status: DISCONTINUED | OUTPATIENT
Start: 2022-12-17 | End: 2022-12-19

## 2022-12-18 LAB
ALBUMIN SERPL-MCNC: 3.4 G/DL (ref 3.4–5)
ALBUMIN/GLOB SERPL: 1.1 {RATIO} (ref 1–2)
ALP LIVER SERPL-CCNC: 63 U/L
ALT SERPL-CCNC: 30 U/L
ANION GAP SERPL CALC-SCNC: 8 MMOL/L (ref 0–18)
AST SERPL-CCNC: 33 U/L (ref 15–37)
BASOPHILS # BLD AUTO: 0.01 X10(3) UL (ref 0–0.2)
BASOPHILS NFR BLD AUTO: 0.4 %
BILIRUB SERPL-MCNC: 0.7 MG/DL (ref 0.1–2)
BUN BLD-MCNC: 24 MG/DL (ref 7–18)
BUN/CREAT SERPL: 23.8 (ref 10–20)
CALCIUM BLD-MCNC: 8.6 MG/DL (ref 8.5–10.1)
CHLORIDE SERPL-SCNC: 104 MMOL/L (ref 98–112)
CO2 SERPL-SCNC: 26 MMOL/L (ref 21–32)
CREAT BLD-MCNC: 1.01 MG/DL
CRP SERPL-MCNC: 4.2 MG/DL (ref ?–0.3)
DEPRECATED HBV CORE AB SER IA-ACNC: 141.2 NG/ML
DEPRECATED RDW RBC AUTO: 52.1 FL (ref 35.1–46.3)
EOSINOPHIL # BLD AUTO: 0.01 X10(3) UL (ref 0–0.7)
EOSINOPHIL NFR BLD AUTO: 0.4 %
ERYTHROCYTE [DISTWIDTH] IN BLOOD BY AUTOMATED COUNT: 14.8 % (ref 11–15)
GFR SERPLBLD BASED ON 1.73 SQ M-ARVRAT: 75 ML/MIN/1.73M2 (ref 60–?)
GLOBULIN PLAS-MCNC: 3.2 G/DL (ref 2.8–4.4)
GLUCOSE BLD-MCNC: 146 MG/DL (ref 70–99)
GLUCOSE BLDC GLUCOMTR-MCNC: 138 MG/DL (ref 70–99)
GLUCOSE BLDC GLUCOMTR-MCNC: 140 MG/DL (ref 70–99)
GLUCOSE BLDC GLUCOMTR-MCNC: 152 MG/DL (ref 70–99)
GLUCOSE BLDC GLUCOMTR-MCNC: 157 MG/DL (ref 70–99)
HCT VFR BLD AUTO: 36 %
HGB BLD-MCNC: 11.6 G/DL
IMM GRANULOCYTES # BLD AUTO: 0.01 X10(3) UL (ref 0–1)
IMM GRANULOCYTES NFR BLD: 0.4 %
LDH SERPL L TO P-CCNC: 184 U/L
LYMPHOCYTES # BLD AUTO: 0.55 X10(3) UL (ref 1–4)
LYMPHOCYTES NFR BLD AUTO: 19.7 %
MAGNESIUM SERPL-MCNC: 1.3 MG/DL (ref 1.6–2.6)
MCH RBC QN AUTO: 30.5 PG (ref 26–34)
MCHC RBC AUTO-ENTMCNC: 32.2 G/DL (ref 31–37)
MCV RBC AUTO: 94.7 FL
MONOCYTES # BLD AUTO: 0.4 X10(3) UL (ref 0.1–1)
MONOCYTES NFR BLD AUTO: 14.3 %
NEUTROPHILS # BLD AUTO: 1.81 X10 (3) UL (ref 1.5–7.7)
NEUTROPHILS # BLD AUTO: 1.81 X10(3) UL (ref 1.5–7.7)
NEUTROPHILS NFR BLD AUTO: 64.8 %
OSMOLALITY SERPL CALC.SUM OF ELEC: 293 MOSM/KG (ref 275–295)
PLATELET # BLD AUTO: 107 10(3)UL (ref 150–450)
POTASSIUM SERPL-SCNC: 3.6 MMOL/L (ref 3.5–5.1)
POTASSIUM SERPL-SCNC: 3.6 MMOL/L (ref 3.5–5.1)
PROT SERPL-MCNC: 6.6 G/DL (ref 6.4–8.2)
RBC # BLD AUTO: 3.8 X10(6)UL
SODIUM SERPL-SCNC: 138 MMOL/L (ref 136–145)
WBC # BLD AUTO: 2.8 X10(3) UL (ref 4–11)

## 2022-12-18 PROCEDURE — 99223 1ST HOSP IP/OBS HIGH 75: CPT | Performed by: HOSPITALIST

## 2022-12-18 RX ORDER — ATORVASTATIN CALCIUM 80 MG/1
80 TABLET, FILM COATED ORAL NIGHTLY
Status: DISCONTINUED | OUTPATIENT
Start: 2022-12-18 | End: 2022-12-19

## 2022-12-18 RX ORDER — AMLODIPINE BESYLATE 10 MG/1
10 TABLET ORAL DAILY
COMMUNITY
End: 2022-12-19

## 2022-12-18 RX ORDER — ASPIRIN 81 MG/1
81 TABLET ORAL DAILY
Status: DISCONTINUED | OUTPATIENT
Start: 2022-12-18 | End: 2022-12-19

## 2022-12-18 RX ORDER — ACETAMINOPHEN 325 MG/1
650 TABLET ORAL EVERY 6 HOURS PRN
Status: DISCONTINUED | OUTPATIENT
Start: 2022-12-18 | End: 2022-12-19

## 2022-12-18 RX ORDER — ECHINACEA PURPUREA EXTRACT 125 MG
1 TABLET ORAL
Status: DISCONTINUED | OUTPATIENT
Start: 2022-12-18 | End: 2022-12-19

## 2022-12-18 RX ORDER — MELATONIN
325 DAILY
Status: DISCONTINUED | OUTPATIENT
Start: 2022-12-18 | End: 2022-12-19

## 2022-12-18 RX ORDER — ALBUTEROL SULFATE 90 UG/1
2 AEROSOL, METERED RESPIRATORY (INHALATION) 4 TIMES DAILY
Status: DISCONTINUED | OUTPATIENT
Start: 2022-12-18 | End: 2022-12-19

## 2022-12-18 RX ORDER — LOSARTAN POTASSIUM 100 MG/1
100 TABLET ORAL DAILY
Status: DISCONTINUED | OUTPATIENT
Start: 2022-12-18 | End: 2022-12-19

## 2022-12-18 RX ORDER — AMLODIPINE BESYLATE 5 MG/1
5 TABLET ORAL DAILY
Status: DISCONTINUED | OUTPATIENT
Start: 2022-12-19 | End: 2022-12-19

## 2022-12-18 RX ORDER — AMLODIPINE BESYLATE 10 MG/1
10 TABLET ORAL DAILY
Status: DISCONTINUED | OUTPATIENT
Start: 2022-12-18 | End: 2022-12-18

## 2022-12-18 RX ORDER — MAGNESIUM OXIDE 400 MG/1
800 TABLET ORAL ONCE
Status: COMPLETED | OUTPATIENT
Start: 2022-12-18 | End: 2022-12-18

## 2022-12-18 RX ORDER — AMLODIPINE BESYLATE 5 MG/1
5 TABLET ORAL ONCE
Status: COMPLETED | OUTPATIENT
Start: 2022-12-18 | End: 2022-12-18

## 2022-12-18 RX ORDER — TRAMADOL HYDROCHLORIDE 50 MG/1
50 TABLET ORAL EVERY 6 HOURS PRN
Status: DISCONTINUED | OUTPATIENT
Start: 2022-12-18 | End: 2022-12-19

## 2022-12-18 RX ORDER — SODIUM CHLORIDE 9 MG/ML
INJECTION, SOLUTION INTRAVENOUS CONTINUOUS
Status: DISCONTINUED | OUTPATIENT
Start: 2022-12-18 | End: 2022-12-19

## 2022-12-18 RX ORDER — CALCIUM CARBONATE 200(500)MG
500 TABLET,CHEWABLE ORAL 3 TIMES DAILY PRN
Status: DISCONTINUED | OUTPATIENT
Start: 2022-12-18 | End: 2022-12-19

## 2022-12-18 NOTE — PLAN OF CARE
Patient arrived on unit with daughter at bedside. Patient mainly [place] speaking, daughter helped translate for patient. From home with his wife. Alert and oriented. RA. SCDs on for DVT prophylaxis. Voiding freely. Up with standby assistance. Accuchecks. Call light within reach, bed alarm activ.e  Problem: Patient Centered Care  Goal: Patient preferences are identified and integrated in the patient's plan of care  Description: Interventions:  - What would you like us to know as we care for you?  I live with my wife, I speak [place].  - Provide timely, complete, and accurate information to patient/family  - Incorporate patient and family knowledge, values, beliefs, and cultural backgrounds into the planning and delivery of care  - Encourage patient/family to participate in care and decision-making at the level they choose  - Honor patient and family perspectives and choices  Outcome: Progressing     Problem: Diabetes/Glucose Control  Goal: Glucose maintained within prescribed range  Description: INTERVENTIONS:  - Monitor Blood Glucose as ordered  - Assess for signs and symptoms of hyperglycemia and hypoglycemia  - Administer ordered medications to maintain glucose within target range  - Assess barriers to adequate nutritional intake and initiate nutrition consult as needed  - Instruct patient on self management of diabetes  Outcome: Progressing     Problem: PAIN - ADULT  Goal: Verbalizes/displays adequate comfort level or patient's stated pain goal  Description: INTERVENTIONS:  - Encourage pt to monitor pain and request assistance  - Assess pain using appropriate pain scale  - Administer analgesics based on type and severity of pain and evaluate response  - Implement non-pharmacological measures as appropriate and evaluate response  - Consider cultural and social influences on pain and pain management  - Manage/alleviate anxiety  - Utilize distraction and/or relaxation techniques  - Monitor for opioid side effects  - Notify MD/LIP if interventions unsuccessful or patient reports new pain  - Anticipate increased pain with activity and pre-medicate as appropriate  Outcome: Progressing     Problem: RISK FOR INFECTION - ADULT  Goal: Absence of fever/infection during anticipated neutropenic period  Description: INTERVENTIONS  - Monitor WBC  - Administer growth factors as ordered  - Implement neutropenic guidelines  Outcome: Progressing     Problem: SAFETY ADULT - FALL  Goal: Free from fall injury  Description: INTERVENTIONS:  - Assess pt frequently for physical needs  - Identify cognitive and physical deficits and behaviors that affect risk of falls.   - [place] fall precautions as indicated by assessment.  - Educate pt/family on patient safety including physical limitations  - Instruct pt to call for assistance with activity based on assessment  - Modify environment to reduce risk of injury  - Provide assistive devices as appropriate  - Consider OT/PT consult to assist with strengthening/mobility  - Encourage toileting schedule  Outcome: Progressing     Problem: DISCHARGE PLANNING  Goal: Discharge to home or other facility with appropriate resources  Description: INTERVENTIONS:  - Identify barriers to discharge w/pt and caregiver  - Include patient/family/discharge partner in discharge planning  - Arrange for needed discharge resources and transportation as appropriate  - Identify discharge learning needs (meds, wound care, etc)  - Arrange for interpreters to assist at discharge as needed  - Consider post-discharge preferences of patient/family/discharge partner  - Complete POLST form as appropriate  - Assess patient's ability to be responsible for managing their own health  - Refer to Case Management Department for coordinating discharge planning if the patient needs post-hospital services based on physician/LIP order or complex needs related to functional status, cognitive ability or social support system  Outcome: Progressing

## 2022-12-18 NOTE — ED QUICK NOTES
Orders for admission, patient is aware of plan and ready to go upstairs. Any questions, please call ED RN Sarah at [address].      Patient Covid vaccination status: Fully vaccinated     COVID Test Ordered in ED: Rapid SARS-CoV-2 by PCR    COVID Suspicion at Admission: N/A    Running Infusions:  None    Mental Status/LOC at time of transport: A&O*4      Other pertinent information: [place] speaking  CIWA score: N/A   NIH score:  N/A

## 2022-12-18 NOTE — PLAN OF CARE
Patient alert and oriented x4. [facility] speaking only, [place]. Patient on room air. Patient is resting on bed. Denies pain at this time. Patient is self ambulatory, no assistive device. Daughter at bedside. Needs are met. Hourly rounding maintained. Call light within reach. ID rounded and no Covid tx to be given. Am labs to be drawn. Will continue to monitor at this time. Problem: Patient Centered Care  Goal: Patient preferences are identified and integrated in the patient's plan of care  Description: Interventions:  - What would you like us to know as we care for you?  From home   - Provide timely, complete, and accurate information to patient/family  - Incorporate patient and family knowledge, values, beliefs, and cultural backgrounds into the planning and delivery of care  - Encourage patient/family to participate in care and decision-making at the level they choose  - Honor patient and family perspectives and choices  Outcome: Progressing     Problem: Diabetes/Glucose Control  Goal: Glucose maintained within prescribed range  Description: INTERVENTIONS:  - Monitor Blood Glucose as ordered  - Assess for signs and symptoms of hyperglycemia and hypoglycemia  - Administer ordered medications to maintain glucose within target range  - Assess barriers to adequate nutritional intake and initiate nutrition consult as needed  - Instruct patient on self management of diabetes  Outcome: Progressing     Problem: PAIN - ADULT  Goal: Verbalizes/displays adequate comfort level or patient's stated pain goal  Description: INTERVENTIONS:  - Encourage pt to monitor pain and request assistance  - Assess pain using appropriate pain scale  - Administer analgesics based on type and severity of pain and evaluate response  - Implement non-pharmacological measures as appropriate and evaluate response  - Consider cultural and social influences on pain and pain management  - Manage/alleviate anxiety  - Utilize distraction and/or relaxation techniques  - Monitor for opioid side effects  - Notify MD/LIP if interventions unsuccessful or patient reports new pain  - Anticipate increased pain with activity and pre-medicate as appropriate  Outcome: Progressing     Problem: RISK FOR INFECTION - ADULT  Goal: Absence of fever/infection during anticipated neutropenic period  Description: INTERVENTIONS  - Monitor WBC  - Administer growth factors as ordered  - Implement neutropenic guidelines  Outcome: Progressing     Problem: SAFETY ADULT - FALL  Goal: Free from fall injury  Description: INTERVENTIONS:  - Assess pt frequently for physical needs  - Identify cognitive and physical deficits and behaviors that affect risk of falls.   - [place] fall precautions as indicated by assessment.  - Educate pt/family on patient safety including physical limitations  - Instruct pt to call for assistance with activity based on assessment  - Modify environment to reduce risk of injury  - Provide assistive devices as appropriate  - Consider OT/PT consult to assist with strengthening/mobility  - Encourage toileting schedule  Outcome: Progressing     Problem: DISCHARGE PLANNING  Goal: Discharge to home or other facility with appropriate resources  Description: INTERVENTIONS:  - Identify barriers to discharge w/pt and caregiver  - Include patient/family/discharge partner in discharge planning  - Arrange for needed discharge resources and transportation as appropriate  - Identify discharge learning needs (meds, wound care, etc)  - Arrange for interpreters to assist at discharge as needed  - Consider post-discharge preferences of patient/family/discharge partner  - Complete POLST form as appropriate  - Assess patient's ability to be responsible for managing their own health  - Refer to Case Management Department for coordinating discharge planning if the patient needs post-hospital services based on physician/LIP order or complex needs related to functional status, cognitive ability or social support system  Outcome: Progressing

## 2022-12-18 NOTE — ED INITIAL ASSESSMENT (HPI)
Pt came in w/ c/o fatigue not feeling well w/ slight cough. Per pt received flu shot Monday 12/12. Per pt denies all other complaints at the moment.

## 2022-12-19 VITALS
BODY MASS INDEX: 28.82 KG/M2 | DIASTOLIC BLOOD PRESSURE: 46 MMHG | WEIGHT: 179.31 LBS | RESPIRATION RATE: 18 BRPM | HEART RATE: 50 BPM | OXYGEN SATURATION: 95 % | SYSTOLIC BLOOD PRESSURE: 125 MMHG | TEMPERATURE: 97 F | HEIGHT: 65.98 IN

## 2022-12-19 LAB
ALBUMIN SERPL-MCNC: 3 G/DL (ref 3.4–5)
ALBUMIN/GLOB SERPL: 1 {RATIO} (ref 1–2)
ALP LIVER SERPL-CCNC: 60 U/L
ALT SERPL-CCNC: 37 U/L
ANION GAP SERPL CALC-SCNC: 6 MMOL/L (ref 0–18)
AST SERPL-CCNC: 45 U/L (ref 15–37)
BASOPHILS # BLD AUTO: 0.01 X10(3) UL (ref 0–0.2)
BASOPHILS NFR BLD AUTO: 0.4 %
BILIRUB SERPL-MCNC: 0.5 MG/DL (ref 0.1–2)
BUN BLD-MCNC: 26 MG/DL (ref 7–18)
BUN/CREAT SERPL: 21.1 (ref 10–20)
CALCIUM BLD-MCNC: 8.2 MG/DL (ref 8.5–10.1)
CHLORIDE SERPL-SCNC: 108 MMOL/L (ref 98–112)
CO2 SERPL-SCNC: 29 MMOL/L (ref 21–32)
CREAT BLD-MCNC: 1.23 MG/DL
CRP SERPL-MCNC: 3.8 MG/DL (ref ?–0.3)
D DIMER PPP FEU-MCNC: 0.78 UG/ML FEU (ref ?–0.81)
DEPRECATED HBV CORE AB SER IA-ACNC: 183.2 NG/ML
DEPRECATED RDW RBC AUTO: 52.5 FL (ref 35.1–46.3)
EOSINOPHIL # BLD AUTO: 0.01 X10(3) UL (ref 0–0.7)
EOSINOPHIL NFR BLD AUTO: 0.4 %
ERYTHROCYTE [DISTWIDTH] IN BLOOD BY AUTOMATED COUNT: 14.9 % (ref 11–15)
GFR SERPLBLD BASED ON 1.73 SQ M-ARVRAT: 59 ML/MIN/1.73M2 (ref 60–?)
GLOBULIN PLAS-MCNC: 3 G/DL (ref 2.8–4.4)
GLUCOSE BLD-MCNC: 133 MG/DL (ref 70–99)
GLUCOSE BLDC GLUCOMTR-MCNC: 139 MG/DL (ref 70–99)
GLUCOSE BLDC GLUCOMTR-MCNC: 257 MG/DL (ref 70–99)
GLUCOSE BLDC GLUCOMTR-MCNC: 365 MG/DL (ref 70–99)
GLUCOSE BLDC GLUCOMTR-MCNC: 370 MG/DL (ref 70–99)
HCT VFR BLD AUTO: 33.9 %
HGB BLD-MCNC: 10.9 G/DL
IMM GRANULOCYTES # BLD AUTO: 0 X10(3) UL (ref 0–1)
IMM GRANULOCYTES NFR BLD: 0 %
LDH SERPL L TO P-CCNC: 187 U/L
LYMPHOCYTES # BLD AUTO: 0.74 X10(3) UL (ref 1–4)
LYMPHOCYTES NFR BLD AUTO: 31.9 %
MAGNESIUM SERPL-MCNC: 1.3 MG/DL (ref 1.6–2.6)
MCH RBC QN AUTO: 30.8 PG (ref 26–34)
MCHC RBC AUTO-ENTMCNC: 32.2 G/DL (ref 31–37)
MCV RBC AUTO: 95.8 FL
MONOCYTES # BLD AUTO: 0.44 X10(3) UL (ref 0.1–1)
MONOCYTES NFR BLD AUTO: 19 %
NEUTROPHILS # BLD AUTO: 1.12 X10 (3) UL (ref 1.5–7.7)
NEUTROPHILS # BLD AUTO: 1.12 X10(3) UL (ref 1.5–7.7)
NEUTROPHILS NFR BLD AUTO: 48.3 %
OSMOLALITY SERPL CALC.SUM OF ELEC: 303 MOSM/KG (ref 275–295)
PLATELET # BLD AUTO: 104 10(3)UL (ref 150–450)
POTASSIUM SERPL-SCNC: 3.8 MMOL/L (ref 3.5–5.1)
PROT SERPL-MCNC: 6 G/DL (ref 6.4–8.2)
RBC # BLD AUTO: 3.54 X10(6)UL
SODIUM SERPL-SCNC: 143 MMOL/L (ref 136–145)
WBC # BLD AUTO: 2.3 X10(3) UL (ref 4–11)

## 2022-12-19 PROCEDURE — 99239 HOSP IP/OBS DSCHRG MGMT >30: CPT | Performed by: INTERNAL MEDICINE

## 2022-12-19 RX ORDER — MAGNESIUM OXIDE 400 MG/1
800 TABLET ORAL ONCE
Status: COMPLETED | OUTPATIENT
Start: 2022-12-19 | End: 2022-12-19

## 2022-12-19 RX ORDER — ACETAMINOPHEN 325 MG/1
650 TABLET ORAL EVERY 6 HOURS PRN
Qty: 12 TABLET | Refills: 0 | Status: SHIPPED | OUTPATIENT
Start: 2022-12-19 | End: 2022-12-22

## 2022-12-19 RX ORDER — MAGNESIUM OXIDE 400 MG/1
800 TABLET ORAL ONCE
Status: DISCONTINUED | OUTPATIENT
Start: 2022-12-19 | End: 2022-12-19

## 2022-12-19 RX ORDER — AMLODIPINE BESYLATE 5 MG/1
5 TABLET ORAL DAILY
Qty: 30 TABLET | Refills: 0 | Status: SHIPPED | OUTPATIENT
Start: 2022-12-20 | End: 2023-01-19

## 2022-12-19 NOTE — PLAN OF CARE
No acute changes. Patient appropriate for discharge per MD. PIV removed, daughter at bedside and updated on plan of care. All belongings and paperwork sent with patient. Problem: Patient Centered Care  Goal: Patient preferences are identified and integrated in the patient's plan of care  Description: Interventions:  - What would you like us to know as we care for you?  From home with wife  - Provide timely, complete, and accurate information to patient/family  - Incorporate patient and family knowledge, values, beliefs, and cultural backgrounds into the planning and delivery of care  - Encourage patient/family to participate in care and decision-making at the level they choose  - Honor patient and family perspectives and choices  Outcome: Adequate for Discharge     Problem: Diabetes/Glucose Control  Goal: Glucose maintained within prescribed range  Description: INTERVENTIONS:  - Monitor Blood Glucose as ordered  - Assess for signs and symptoms of hyperglycemia and hypoglycemia  - Administer ordered medications to maintain glucose within target range  - Assess barriers to adequate nutritional intake and initiate nutrition consult as needed  - Instruct patient on self management of diabetes  Outcome: Adequate for Discharge     Problem: Patient/Family Goals  Goal: Patient/Family Long Term Goal  Description: Patient's Long Term Goal: be discharged    Interventions:  -monitor VS  -monitor appropriate labs  -update patient and family on plan of care  -follow MD orders   - See additional Care Plan goals for specific interventions  Outcome: Adequate for Discharge  Goal: Patient/Family Short Term Goal  Description: Patient's Short Term Goal: feel better    Interventions:   -monitor VS  -monitor appropriate labs  -update patient and family on plan of care  -discharge planning  -follow MD orders   - See additional Care Plan goals for specific interventions  Outcome: Adequate for Discharge     Problem: PAIN - ADULT  Goal: Verbalizes/displays adequate comfort level or patient's stated pain goal  Description: INTERVENTIONS:  - Encourage pt to monitor pain and request assistance  - Assess pain using appropriate pain scale  - Administer analgesics based on type and severity of pain and evaluate response  - Implement non-pharmacological measures as appropriate and evaluate response  - Consider cultural and social influences on pain and pain management  - Manage/alleviate anxiety  - Utilize distraction and/or relaxation techniques  - Monitor for opioid side effects  - Notify MD/LIP if interventions unsuccessful or patient reports new pain  - Anticipate increased pain with activity and pre-medicate as appropriate  Outcome: Adequate for Discharge     Problem: RISK FOR INFECTION - ADULT  Goal: Absence of fever/infection during anticipated neutropenic period  Description: INTERVENTIONS  - Monitor WBC  - Administer growth factors as ordered  - Implement neutropenic guidelines  Outcome: Adequate for Discharge     Problem: SAFETY ADULT - FALL  Goal: Free from fall injury  Description: INTERVENTIONS:  - Assess pt frequently for physical needs  - Identify cognitive and physical deficits and behaviors that affect risk of falls.   - [place] fall precautions as indicated by assessment.  - Educate pt/family on patient safety including physical limitations  - Instruct pt to call for assistance with activity based on assessment  - Modify environment to reduce risk of injury  - Provide assistive devices as appropriate  - Consider OT/PT consult to assist with strengthening/mobility  - Encourage toileting schedule  Outcome: Adequate for Discharge     Problem: DISCHARGE PLANNING  Goal: Discharge to home or other facility with appropriate resources  Description: INTERVENTIONS:  - Identify barriers to discharge w/pt and caregiver  - Include patient/family/discharge partner in discharge planning  - Arrange for needed discharge resources and transportation as appropriate  - Identify discharge learning needs (meds, wound care, etc)  - Arrange for interpreters to assist at discharge as needed  - Consider post-discharge preferences of patient/family/discharge partner  - Complete POLST form as appropriate  - Assess patient's ability to be responsible for managing their own health  - Refer to Case Management Department for coordinating discharge planning if the patient needs post-hospital services based on physician/LIP order or complex needs related to functional status, cognitive ability or social support system  Outcome: Adequate for Discharge

## 2022-12-19 NOTE — PLAN OF CARE
Patient is alert and oriented. RA. Voiding freely. IV fluids infusing. Up with standby assistance. Call light within reach, bed alarm active. Problem: Patient Centered Care  Goal: Patient preferences are identified and integrated in the patient's plan of care  Description: Interventions:  - What would you like us to know as we care for you?  I live at home with my wife  - Provide timely, complete, and accurate information to patient/family  - Incorporate patient and family knowledge, values, beliefs, and cultural backgrounds into the planning and delivery of care  - Encourage patient/family to participate in care and decision-making at the level they choose  - Honor patient and family perspectives and choices  Outcome: Progressing     Problem: Diabetes/Glucose Control  Goal: Glucose maintained within prescribed range  Description: INTERVENTIONS:  - Monitor Blood Glucose as ordered  - Assess for signs and symptoms of hyperglycemia and hypoglycemia  - Administer ordered medications to maintain glucose within target range  - Assess barriers to adequate nutritional intake and initiate nutrition consult as needed  - Instruct patient on self management of diabetes  Outcome: Progressing     Problem: PAIN - ADULT  Goal: Verbalizes/displays adequate comfort level or patient's stated pain goal  Description: INTERVENTIONS:  - Encourage pt to monitor pain and request assistance  - Assess pain using appropriate pain scale  - Administer analgesics based on type and severity of pain and evaluate response  - Implement non-pharmacological measures as appropriate and evaluate response  - Consider cultural and social influences on pain and pain management  - Manage/alleviate anxiety  - Utilize distraction and/or relaxation techniques  - Monitor for opioid side effects  - Notify MD/LIP if interventions unsuccessful or patient reports new pain  - Anticipate increased pain with activity and pre-medicate as appropriate  Outcome: Progressing     Problem: SAFETY ADULT - FALL  Goal: Free from fall injury  Description: INTERVENTIONS:  - Assess pt frequently for physical needs  - Identify cognitive and physical deficits and behaviors that affect risk of falls.   - [place] fall precautions as indicated by assessment.  - Educate pt/family on patient safety including physical limitations  - Instruct pt to call for assistance with activity based on assessment  - Modify environment to reduce risk of injury  - Provide assistive devices as appropriate  - Consider OT/PT consult to assist with strengthening/mobility  - Encourage toileting schedule  Outcome: Progressing     Problem: RISK FOR INFECTION - ADULT  Goal: Absence of fever/infection during anticipated neutropenic period  Description: INTERVENTIONS  - Monitor WBC  - Administer growth factors as ordered  - Implement neutropenic guidelines  Outcome: Progressing     Problem: DISCHARGE PLANNING  Goal: Discharge to home or other facility with appropriate resources  Description: INTERVENTIONS:  - Identify barriers to discharge w/pt and caregiver  - Include patient/family/discharge partner in discharge planning  - Arrange for needed discharge resources and transportation as appropriate  - Identify discharge learning needs (meds, wound care, etc)  - Arrange for interpreters to assist at discharge as needed  - Consider post-discharge preferences of patient/family/discharge partner  - Complete POLST form as appropriate  - Assess patient's ability to be responsible for managing their own health  - Refer to Case Management Department for coordinating discharge planning if the patient needs post-hospital services based on physician/LIP order or complex needs related to functional status, cognitive ability or social support system  Outcome: Progressing

## 2023-04-14 NOTE — LETTER
[address], [place]  Authorization for Invasive Procedures  1.  I hereby authorize  Dr. Sanjay Murillo  , my physician and whomever may be designated as the doctor's assistant, to perform the following operation and/or procedure: Cardiac Ca 5. I consent to the photographing of the operations or procedures to be performed for the purposes of advancing medicine, science, and/or education, provided my identity is not revealed.  If the procedure has been videotaped, the physician/surgeon will obta __________ Time: ___________    Statement of Physician  My signature below affirms that prior to the time of the procedure, I have explained to the patient and/or his legal representative, the risks and benefits involved in the proposed treatment and any r Ambulatory

## 2024-07-05 ENCOUNTER — APPOINTMENT (OUTPATIENT)
Dept: CT IMAGING | Facility: HOSPITAL | Age: 84
End: 2024-07-05
Attending: EMERGENCY MEDICINE
Payer: MEDICARE

## 2024-07-05 ENCOUNTER — HOSPITAL ENCOUNTER (EMERGENCY)
Facility: HOSPITAL | Age: 84
Discharge: HOME | End: 2024-07-05
Attending: EMERGENCY MEDICINE
Payer: MEDICARE

## 2024-07-05 VITALS
SYSTOLIC BLOOD PRESSURE: 187 MMHG | TEMPERATURE: 98 F | DIASTOLIC BLOOD PRESSURE: 72 MMHG | OXYGEN SATURATION: 97 % | RESPIRATION RATE: 14 BRPM | HEART RATE: 66 BPM | BODY MASS INDEX: 29 KG/M2 | WEIGHT: 179.25 LBS

## 2024-07-05 DIAGNOSIS — S01.312A EAR LOBE LACERATION, LEFT, INITIAL ENCOUNTER: ICD-10-CM

## 2024-07-05 DIAGNOSIS — S09.90XA INJURY OF HEAD, INITIAL ENCOUNTER: Primary | ICD-10-CM

## 2024-07-05 PROCEDURE — 99284 EMERGENCY DEPT VISIT MOD MDM: CPT

## 2024-07-05 PROCEDURE — 12011 RPR F/E/E/N/L/M 2.5 CM/<: CPT

## 2024-07-05 PROCEDURE — 70450 CT HEAD/BRAIN W/O DYE: CPT | Performed by: EMERGENCY MEDICINE

## 2024-07-05 RX ORDER — AMOXICILLIN AND CLAVULANATE POTASSIUM 875; 125 MG/1; MG/1
1 TABLET, FILM COATED ORAL 2 TIMES DAILY
Qty: 14 TABLET | Refills: 0 | Status: SHIPPED | OUTPATIENT
Start: 2024-07-05 | End: 2024-07-12

## 2024-07-05 RX ORDER — ACETAMINOPHEN 500 MG
1000 TABLET ORAL ONCE
Status: COMPLETED | OUTPATIENT
Start: 2024-07-05 | End: 2024-07-05

## 2024-07-05 RX ORDER — AMOXICILLIN AND CLAVULANATE POTASSIUM 875; 125 MG/1; MG/1
875 TABLET, FILM COATED ORAL ONCE
Status: COMPLETED | OUTPATIENT
Start: 2024-07-05 | End: 2024-07-05

## 2024-07-05 NOTE — ED INITIAL ASSESSMENT (HPI)
Patient was walking the dog outside and was walking down the steps when the dog pulled the patient and he fell cutting his left ear. Denies LOC. +hitting head. Denies dizziness. ASA 81.

## 2024-07-05 NOTE — DISCHARGE INSTRUCTIONS
Take the antibiotic as prescribed.  Use ice on the ear.  Tylenol for pain.  Please follow-up on Monday for wound recheck.  Please keep the dressing placed in the ER in place for at least 24 hours.  You may remove it gently then and apply antibiotic ointment.

## 2024-07-05 NOTE — ED PROVIDER NOTES
Patient Seen in: [facility] Emergency Department      History     Chief Complaint   Patient presents with    Fall     Stated Complaint: fall,left ear lac    Subjective:   HPI    84-year-old male on aspirin with mechanical fall today on stairs.  He did strike his left ear.  Mild headache.  Bleeding controlled.  No neck pain or other injury.  No LOC.  Here with his grandson.    Objective:   Past Medical History:    Cancer (HCC)    Coronary atherosclerosis    Diabetes (HCC)    High blood pressure    High cholesterol    Osteoarthritis              Past Surgical History:   Procedure Laterality Date    Cholecystectomy      Colectomy                  Social History     Socioeconomic History    Marital status:    Tobacco Use    Smoking status: Never    Smokeless tobacco: Never   Vaping Use    Vaping status: Never Used   Substance and Sexual Activity    Alcohol use: Not Currently    Drug use: Never              Review of Systems    Positive for stated Chief Complaint: Fall    Other systems are as noted in HPI.  Constitutional and vital signs reviewed.      All other systems reviewed and negative except as noted above.    Physical Exam     ED Triage Vitals [07/05/24 1632]   BP (!) 182/72   Pulse 66   Resp 21   Temp 97.5 °F (36.4 °C)   Temp src Temporal   SpO2 99 %   O2 Device None (Room air)       Current Vitals:   Vital Signs  BP: (!) 187/72  Pulse: 66  Resp: 14  Temp: 97.5 °F (36.4 °C)  Temp src: Temporal  MAP (mmHg): (!) 105    Oxygen Therapy  SpO2: 97 %  O2 Device: None (Room air)            Physical Exam    Constitutional: Oriented to person, place, and time.  Appears well-developed. No distress.   Head: Normocephalic.  Eyes: Conjunctivae are normal. Pupils are equal, round, and reactive to light.   ENT: The superior portion of the left external ear just inside the border of the [place] there is an irregular approximately 1-1/2 cm avulsion laceration.  There is some exposed cartilage.  No obvious other  PHYSICAL THERAPY NOTE          Patient Name: Key Salinas  IZJNT'A Date: 7/11/2023 07/11/23 1415   PT Last Visit   PT Visit Date 07/11/23   Note Type   Note Type Treatment   Pain Assessment   Pain Assessment Tool 0-10   Pain Score 4   Pain Location/Orientation Location: Neck   Pain Onset/Description Onset: Ongoing; Descriptor: Aching;Descriptor: Discomfort   Patient's Stated Pain Goal No pain   Restrictions/Precautions   Weight Bearing Precautions Per Order No   Other Precautions Chair Alarm; Bed Alarm;Multiple lines;Telemetry;O2;Fall Risk;Pain;Spinal precautions  (2L O2)   General   Chart Reviewed Yes   Cognition   Overall Cognitive Status WFL   Arousal/Participation Responsive; Cooperative   Attention Attends with cues to redirect   Orientation Level Oriented to person;Oriented to place;Oriented to time  (knows he is in a hospital but was unsure of which one; originally named incorrect year)   Memory Decreased recall of precautions;Decreased recall of recent events   Following Commands Follows one step commands without difficulty   Bed Mobility   Supine to Sit 2  Maximal assistance   Additional items Assist x 2;HOB elevated; Increased time required;LE management   Sit to Supine Unable to assess  (pt left sitting in recliner with call bell in reach and all needs met)   Transfers   Sit to Stand 2  Maximal assistance   Additional items Assist x 2; Increased time required   Stand to Sit 2  Maximal assistance   Additional items Assist x 2; Increased time required   Stand pivot 2  Maximal assistance   Additional items Assist x 2; Increased time required   Ambulation/Elevation   Gait pattern Narrow [place]; Forward Flexion;Decreased foot clearance;Shuffling; Ataxia   Gait Assistance 2  Maximal assist   Additional items Assist x 2   Assistive Device   (BL HHA)   Distance 2ft bed to drop arm of chair   Balance   Static Sitting Poor +   Dynamic Sitting Poor +   Static Standing Poor   Dynamic Standing Poor   Ambulatory Poor -   Activity Tolerance   Activity Tolerance Patient limited by fatigue   Medical Staff Made Aware Co-session with OT   Assessment   Prognosis Guarded   Problem List Decreased strength;Decreased endurance; Impaired balance;Decreased mobility; Impaired sensation   Assessment Pt seen for PT treatment session today, focusing on bed mobility, sitting balance, and transfers to chair. Pt continues to display significant UE weakness, with LUE strength greater than RUE. BLEs demonstrate grossly at least 3-/5 strength, as evidenced by appreciable movement against gravity. Pt displayed improved sitting tolerance EOB, however continues to require max Ax2 for all mobility, including supine-to-sit, sit<>stand, stand-pivot, and several short steps towards chair. Pt was able to shuffle feet approx. 2ft towards chair before turning and sitting on the drop arm of the chair; required max Ax2 for stand-pivot-transfer to finish transition from bed to chair. Pt reported dizziness and feeling "unwell" prior to standing, though BP was 117/77 when assessed immediately after report, and pt was not desatting. The patient's AM-PAC Basic Mobility Inpatient Short Form Raw Score is 7. A Raw score of less than 16 suggests the patient may benefit from discharge to post-acute rehabilitation services. Please also refer to the recommendation of the Physical Therapist for safe discharge planning. Recommend post-acute rehab services upon d/c; SCI rehab preferable. Pt would benefit from continued skilled PT intervention to address the aforementioned impairments to allow for optimal functional mobility, safety, and independence upon discharge. All current goals still appropriate at this time. At end of session, pt was left sitting comfortably in recliner with call bell in reach and all current needs met.    Goals   Patient Goals to get stronger; to go home   STG Expiration Date 07/21/23 signs of injury to the ear.  Minimal swelling.  No significant hematoma.  Neck: Normal range of motion. Neck supple.  No midline neck pain posteriorly  Cardiovascular: Normal rate, regular rhythm and intact distal pulses.    Pulmonary/Chest: Effort normal. No respiratory distress.   Abdominal: Soft. There is no tenderness. There is no guarding.   Musculoskeletal: Normal range of motion. No edema or tenderness.   Neurological: Alert and oriented to person, place, and time.  No gross focal deficits  Skin: Skin is warm and dry.   Psychiatric: Normal mood and affect.  Behavior is normal.   Nursing note and vitals reviewed.    Differential diagnosis includes mechanical fall with head injury, ICH or skull fracture, complex ear laceration.      ED Course   Labs Reviewed - No data to display          CT BRAIN OR HEAD (00281)    Result Date: 7/5/2024  PROCEDURE: CT BRAIN OR HEAD (CPT=70450)  COMPARISON: None available.  INDICATIONS: Fall traumatic head injury; left ear laceration.  TECHNIQUE: CT images were obtained without contrast material. Automated exposure control for dose reduction was used. Dose information was transmitted to the ACR (American College of Radiology) NRDR (National Radiology Data Registry), which includes the Dose Index Registry.   FINDINGS:  CSF SPACES: The ventricles, cisterns, and sulci are dilated, but remain commensurate in caliber, consistent with parenchymal volume loss of a degree that is appropriate for age. No hydrocephalus, subarachnoid hemorrhage, or effacement of the basal cisterns is appreciated. There is no extra-axial fluid collection. CEREBRUM: No acute intraparenchymal hemorrhage, edema, or cortical sulcal effacement is apparent. There is no space-occupying lesion, mass effect, or shift of midline structures. The gray-white matter junction is preserved and bilaterally symmetric in appearance.  Scattered periventricular and subcortical white matter hypodensities are present, consistent  Plan   Progress Slow progress, medical status limitations   Recommendation   PT Discharge Recommendation Post acute rehabilitation services   AM-PAC Basic Mobility Inpatient   Turning in Flat Bed Without Bedrails 2   Lying on Back to Sitting on Edge of Flat Bed Without Bedrails 1   Moving Bed to Chair 1   Standing Up From Chair Using Arms 1   Walk in Room 1   Climb 3-5 Stairs With Railing 1   Basic Mobility Inpatient Raw Score 7   Highest Level Of Mobility   -[facility] Goal 2: Bed activities/Dependent transfer     Chris Hogan, SPT with chronic microvascular ischemic disease. CEREBELLUM: No edema, hemorrhage, mass, or acute infarction is seen. There is cerebellar folial expansion consistent with atrophy.  BRAINSTEM: Patchy areas of low attenuation likely reflect sequela of chronic small vessel ischemic disease. No edema, hemorrhage, mass, or acute infarction is seen. There is commensurate atrophy.  CALVARIUM: There is no apparent depressed fracture, mass, or other significant visible lesion.  SINUSES: Limited views demonstrate mild mucosal thickening of the maxillary sinuses bilaterally.  ORBITS: Limited views are notable for postoperative changes of the lenses bilaterally.  OTHER: Atherosclerotic vascular calcifications are perceived in the cavernous carotid and distal vertebral arteries. The mastoid complexes are hypoplastic and underpneumatized bilaterally. The patient appears edentulous on the  view.          CONCLUSION:  1. Negative for depressed calvarial fracture, coup/contrecoup intraparenchymal contusion, intracranial hemorrhage, or further evidence of acute intracranial process by noncontrast CT technique.  2. Senescent changes of parenchymal volume loss with sequela of chronic microvascular ischemic disease.  3. There is large vessel atherosclerosis involving the anterior and posterior circulations.  4. Lesser incidental findings as above.      Dictated by (CST): Khari Webb MD on 7/05/2024 at 5:51 PM     Finalized by (CST): Khari Webb MD on 7/05/2024 at 5:53 PM                  [facility]                                         Medical Decision Making  Ear laceration was cleansed and irrigated by me personally.  Repair was complex and noted below.  His daughter is here with him.  Recommended Tylenol for pain.  Elevation of his head tonight.  Ice.  First dose antibiotic given here.  Primary and ENT follow-up.  Patient will come back with worsening or change.  He can continue any prescribed medications as well including  aspirin.          Procedure: Laceration Repair  Verbal consent was obtained from the patient. The laceration was anesthetized in the usual fashion with none needed. The wound was cleaned, draped and explored and there was evidence of exposed cartilage.  The wound was repaired with several layers of tissue glue in an organized fashion. The wound repair was complex. The procedure was performed by myself.  The wound was dressed by emergency department staff.    Problems Addressed:  Ear lobe laceration, left, initial encounter: complicated acute illness or injury  Injury of head, initial encounter: acute illness or injury    Amount and/or Complexity of Data Reviewed  Radiology: ordered and independent interpretation performed. Decision-making details documented in ED Course.     Details: By my review of the noncontrast head CT, there is no obvious evidence of intracranial bleeding, intracranial mass or midline shift.    Risk  OTC drugs.  Prescription drug management.  Minor surgery with identified risk factors.        Disposition and Plan     Clinical Impression:  1. Injury of head, initial encounter    2. Ear lobe laceration, left, initial encounter         Disposition:  Discharge  7/5/2024  6:55 pm    Follow-up:  Angela Ryan MD (I)  [address] [facility] [id number] [phone]    Schedule an appointment as soon as possible for a visit in 3 day(s)  For wound re-check    Sherif Singh MD  [address] [id number] [phone]    Schedule an appointment as soon as possible for a visit in 3 day(s)  For wound re-check    We recommend that you schedule follow up care with a primary care provider within the next three months to obtain basic health screening including reassessment of your blood pressure.      Medications Prescribed:  Current Discharge Medication List        START taking these medications    Details   amoxicillin clavulanate 875-125 MG Oral Tab Take 1 tablet by mouth  2 (two) times daily for 7 days.  Qty: 14 tablet, Refills: 0

## 2024-07-06 ENCOUNTER — HOSPITAL ENCOUNTER (EMERGENCY)
Facility: HOSPITAL | Age: 84
Discharge: LEFT BEFORE DISCHARGE | End: 2024-07-06
Payer: MEDICARE

## 2024-07-06 NOTE — ED INITIAL ASSESSMENT (HPI)
Pt arrives to ED for \"bandage not loosened\". Pt states he was here yesterday for laceration to the L ear and laceration was glued and bandage was applied to cover it. Pt states Dr told him to keep bandage on for 24 hours but bandage was falling down today and pt came to ED to be adjusted. Pt refused vitals and refused to see a Dr. Denied pain. No bleeding note on laceration. Laceration clean and intact. Ne bandage was applied to the L ear. Pt states he was going home since that's all he came to ED for.

## 2024-09-09 ENCOUNTER — HOSPITAL ENCOUNTER (EMERGENCY)
Facility: HOSPITAL | Age: 84
Discharge: HOME | End: 2024-09-09
Attending: EMERGENCY MEDICINE
Payer: MEDICARE

## 2024-09-09 ENCOUNTER — APPOINTMENT (OUTPATIENT)
Dept: GENERAL RADIOLOGY | Facility: HOSPITAL | Age: 84
End: 2024-09-09
Payer: MEDICARE

## 2024-09-09 VITALS
RESPIRATION RATE: 18 BRPM | WEIGHT: 175 LBS | HEIGHT: 69 IN | DIASTOLIC BLOOD PRESSURE: 65 MMHG | TEMPERATURE: 98 F | BODY MASS INDEX: 25.92 KG/M2 | SYSTOLIC BLOOD PRESSURE: 150 MMHG | OXYGEN SATURATION: 98 % | HEART RATE: 70 BPM

## 2024-09-09 DIAGNOSIS — U07.1 COVID: Primary | ICD-10-CM

## 2024-09-09 PROCEDURE — 99284 EMERGENCY DEPT VISIT MOD MDM: CPT

## 2024-09-09 PROCEDURE — 93010 ELECTROCARDIOGRAM REPORT: CPT

## 2024-09-09 PROCEDURE — 99283 EMERGENCY DEPT VISIT LOW MDM: CPT

## 2024-09-09 PROCEDURE — 93005 ELECTROCARDIOGRAM TRACING: CPT

## 2024-09-09 PROCEDURE — 71045 X-RAY EXAM CHEST 1 VIEW: CPT

## 2024-09-09 NOTE — DISCHARGE INSTRUCTIONS
Continue Paxlovid as prescribed previously.    Take Tylenol or ibuprofen as needed for fever and/or comfort.    Stay hydrated and get rest.    Call primary care with any follow-up concerns.    Return to the ER if you develop worsening symptoms, inability to tolerate fluids, pulse ox less than 93%, or any emergent concerns.

## 2024-09-09 NOTE — ED QUICK NOTES
PT to ED with daughter, daughter provided interpretation in Slovenian as needed.   Daughter reporting PT dx with COVID today in IC, came to ER due to GHAZALA after IC visit. Daughter reports pt's wife also has COVID.

## 2024-09-09 NOTE — ED INITIAL ASSESSMENT (HPI)
Pt dx with covid today, CP  w/ dyspnea starting today with cough, sore throat. Pt took first dose of paxlovid today at 1300.

## 2024-09-09 NOTE — ED PROVIDER NOTES
Patient Seen in: [facility] Emergency Department      History     Chief Complaint   Patient presents with    Covid     Stated Complaint: Chest pain,Sob, Covid+    Subjective:   HPI    84-year-old male with multiple medical problems presents to the emergency department for evaluation of chest pain and shortness of breath.  Patient tested positive for COVID earlier today, reports he was feeling fine yesterday, however today has had fatigue, sore throat, cough, congestion.  Patient reports chest pain with coughing.  No vomiting.  Started Paxlovid when seen by immediate care earlier today.    Objective:   Past Medical History:    Cancer (HCC)    Coronary atherosclerosis    Diabetes (HCC)    High blood pressure    High cholesterol    Osteoarthritis              Past Surgical History:   Procedure Laterality Date    Cholecystectomy      Colectomy                  Social History     Socioeconomic History    Marital status:    Tobacco Use    Smoking status: Never    Smokeless tobacco: Never   Vaping Use    Vaping status: Never Used   Substance and Sexual Activity    Alcohol use: Not Currently    Drug use: Never              Review of Systems    Positive for stated Chief Complaint: Covid    Other systems are as noted in HPI.  Constitutional and vital signs reviewed.      All other systems reviewed and negative except as noted above.    Physical Exam     ED Triage Vitals [09/09/24 1714]   /61   Pulse 82   Resp 20   Temp 98.3 °F (36.8 °C)   Temp src Temporal   SpO2 97 %   O2 Device None (Room air)       Current Vitals:   Vital Signs  BP: 154/67  Pulse: 77  Resp: 20  Temp: 98.3 °F (36.8 °C)  Temp src: Temporal    Oxygen Therapy  SpO2: 97 %  O2 Device: None (Room air)            Physical Exam  Vitals and nursing note reviewed.   Constitutional:       General: He is not in acute distress.     Appearance: He is well-developed.   HENT:      Head: Normocephalic and atraumatic.   Eyes:      Conjunctiva/sclera:  Conjunctivae normal.   Cardiovascular:      Rate and Rhythm: Normal rate and regular rhythm.      Heart sounds: Normal heart sounds.   Pulmonary:      Effort: Pulmonary effort is normal. No respiratory distress.      Breath sounds: Normal breath sounds.   Abdominal:      General: Bowel sounds are normal.      Palpations: Abdomen is soft.      Tenderness: There is no abdominal tenderness.   Musculoskeletal:         General: Normal range of motion.      Cervical back: Normal range of motion and neck supple.   Skin:     General: Skin is warm and dry.      Findings: No rash.   Neurological:      General: No focal deficit present.      Mental Status: He is alert and oriented to person, place, and time.               ED Course   Labs Reviewed - No data to display  EKG    Rate, intervals and axes as noted on EKG Report.  Rate: 86  Rhythm: Sinus Rhythm  Reading: Sinus rhythm with first-degree block, no STEMI or evidence of acute ischemia                 XR CHEST AP PORTABLE  (CPT=71045)    Result Date: 9/9/2024  CONCLUSION:  1. Suboptimal inspiration with subsegmental atelectasis and or scar in both lower lungs.    Dictated by (CST): Ezekiel Gallegos MD on 9/09/2024 at 5:50 PM     Finalized by (CST): Ezekiel Gallegos MD on 9/09/2024 at 5:53 PM                  [facility]                                         Medical Decision Making  Differential diagnosis includes but is not limited to COVID, secondary pneumonia, pneumothorax, ACS, pleural effusion, hypoxemia    Well-appearing patient with normal vital signs, oxygenating normally on room air without respiratory distress.  X-ray as above, no acute findings, EKG without evidence of ischemia.  Discussed findings with patient and daughter at the bedside, suspect symptoms today related to COVID infection.  Discharged with supportive care as well as return precautions.  Both verbalized understanding of and agreement with this plan    Problems Addressed:  COVID: acute illness or  injury    Amount and/or Complexity of Data Reviewed  Radiology: ordered and independent interpretation performed.     Details: Chest x-ray image reviewed, no obvious pneumothorax, other incidental findings deferred to radiology  ECG/medicine tests: ordered and independent interpretation performed. Decision-making details documented in ED Course.        Disposition and Plan     Clinical Impression:  1. COVID         Disposition:  Discharge  9/9/2024  6:21 pm    Follow-up:  Angela Ryan MD (I)  [address] [place] [id number] [phone]    Follow up  As needed    We recommend that you schedule follow up care with a primary care provider within the next three months to obtain basic health screening including reassessment of your blood pressure.      Medications Prescribed:  Current Discharge Medication List

## 2024-09-10 LAB
ATRIAL RATE: 86 BPM
P AXIS: 80 DEGREES
P-R INTERVAL: 288 MS
Q-T INTERVAL: 364 MS
QRS DURATION: 94 MS
QTC CALCULATION (BEZET): 435 MS
R AXIS: -41 DEGREES
T AXIS: 61 DEGREES
VENTRICULAR RATE: 86 BPM

## 2025-02-28 NOTE — ANESTHESIA POSTPROCEDURE EVALUATION
[facility] Patient Status:  Outpatient in a Bed   Age/Gender [de-identified]year old male MRN [id number]   Location [facility] Attending Titus Arnold MD   Hosp Day # 0 PCP Angela Ryan MD       A 1.79

## 2025-03-28 ENCOUNTER — HOSPITAL ENCOUNTER (INPATIENT)
Facility: HOSPITAL | Age: 85
LOS: 4 days | Discharge: HOME HEALTH | End: 2025-04-01
Attending: EMERGENCY MEDICINE | Admitting: HOSPITALIST
Payer: MEDICARE

## 2025-03-28 ENCOUNTER — APPOINTMENT (OUTPATIENT)
Dept: GENERAL RADIOLOGY | Facility: HOSPITAL | Age: 85
End: 2025-03-28
Attending: EMERGENCY MEDICINE
Payer: MEDICARE

## 2025-03-28 ENCOUNTER — APPOINTMENT (OUTPATIENT)
Dept: CT IMAGING | Facility: HOSPITAL | Age: 85
End: 2025-03-28
Attending: EMERGENCY MEDICINE
Payer: MEDICARE

## 2025-03-28 DIAGNOSIS — R73.9 HYPERGLYCEMIA: ICD-10-CM

## 2025-03-28 DIAGNOSIS — K56.609 SBO (SMALL BOWEL OBSTRUCTION) (HCC): Primary | ICD-10-CM

## 2025-03-28 DIAGNOSIS — N32.89 BLADDER MASS: ICD-10-CM

## 2025-03-28 DIAGNOSIS — R31.0 GROSS HEMATURIA: ICD-10-CM

## 2025-03-28 LAB
ALBUMIN SERPL-MCNC: 4.5 G/DL (ref 3.2–4.8)
ALP LIVER SERPL-CCNC: 78 U/L
ALT SERPL-CCNC: 22 U/L
ANION GAP SERPL CALC-SCNC: 14 MMOL/L (ref 0–18)
AST SERPL-CCNC: 24 U/L (ref ?–34)
BASOPHILS # BLD AUTO: 0.03 X10(3) UL (ref 0–0.2)
BASOPHILS NFR BLD AUTO: 0.2 %
BILIRUB DIRECT SERPL-MCNC: 0.3 MG/DL (ref ?–0.3)
BILIRUB SERPL-MCNC: 0.8 MG/DL (ref 0.2–1.1)
BILIRUB UR QL: NEGATIVE
BUN BLD-MCNC: 28 MG/DL (ref 9–23)
BUN/CREAT SERPL: 22.8 (ref 10–20)
CALCIUM BLD-MCNC: 9.5 MG/DL (ref 8.7–10.4)
CHLORIDE SERPL-SCNC: 94 MMOL/L (ref 98–112)
CLARITY UR: CLEAR
CO2 SERPL-SCNC: 28 MMOL/L (ref 21–32)
CREAT BLD-MCNC: 1.23 MG/DL
DEPRECATED RDW RBC AUTO: 46.4 FL (ref 35.1–46.3)
EGFRCR SERPLBLD CKD-EPI 2021: 58 ML/MIN/1.73M2 (ref 60–?)
EOSINOPHIL # BLD AUTO: 0.02 X10(3) UL (ref 0–0.7)
EOSINOPHIL NFR BLD AUTO: 0.2 %
ERYTHROCYTE [DISTWIDTH] IN BLOOD BY AUTOMATED COUNT: 14 % (ref 11–15)
EST. AVERAGE GLUCOSE BLD GHB EST-MCNC: 154 MG/DL (ref 68–126)
GLUCOSE BLD-MCNC: 323 MG/DL (ref 70–99)
GLUCOSE BLDC GLUCOMTR-MCNC: 171 MG/DL (ref 70–99)
GLUCOSE BLDC GLUCOMTR-MCNC: 266 MG/DL (ref 70–99)
GLUCOSE UR-MCNC: >1000 MG/DL
HBA1C MFR BLD: 7 % (ref ?–5.7)
HCT VFR BLD AUTO: 37.7 %
HGB BLD-MCNC: 12.4 G/DL
IMM GRANULOCYTES # BLD AUTO: 0.07 X10(3) UL (ref 0–1)
IMM GRANULOCYTES NFR BLD: 0.6 %
KETONES UR-MCNC: 10 MG/DL
LEUKOCYTE ESTERASE UR QL STRIP.AUTO: NEGATIVE
LIPASE SERPL-CCNC: 23 U/L (ref 12–53)
LYMPHOCYTES # BLD AUTO: 0.33 X10(3) UL (ref 1–4)
LYMPHOCYTES NFR BLD AUTO: 2.7 %
MCH RBC QN AUTO: 30 PG (ref 26–34)
MCHC RBC AUTO-ENTMCNC: 32.9 G/DL (ref 31–37)
MCV RBC AUTO: 91.1 FL
MONOCYTES # BLD AUTO: 0.54 X10(3) UL (ref 0.1–1)
MONOCYTES NFR BLD AUTO: 4.4 %
NEUTROPHILS # BLD AUTO: 11.38 X10 (3) UL (ref 1.5–7.7)
NEUTROPHILS # BLD AUTO: 11.38 X10(3) UL (ref 1.5–7.7)
NEUTROPHILS NFR BLD AUTO: 91.9 %
NITRITE UR QL STRIP.AUTO: NEGATIVE
OSMOLALITY SERPL CALC.SUM OF ELEC: 300 MOSM/KG (ref 275–295)
PH UR: 6.5 [PH] (ref 5–8)
PLATELET # BLD AUTO: 211 10(3)UL (ref 150–450)
POTASSIUM SERPL-SCNC: 4.5 MMOL/L (ref 3.5–5.1)
PROT SERPL-MCNC: 7.1 G/DL (ref 5.7–8.2)
PROT UR-MCNC: 70 MG/DL
RBC # BLD AUTO: 4.14 X10(6)UL
RBC #/AREA URNS AUTO: >10 /HPF
SODIUM SERPL-SCNC: 136 MMOL/L (ref 136–145)
SP GR UR STRIP: 1.02 (ref 1–1.03)
UROBILINOGEN UR STRIP-ACNC: NORMAL
WBC # BLD AUTO: 12.4 X10(3) UL (ref 4–11)

## 2025-03-28 PROCEDURE — 99223 1ST HOSP IP/OBS HIGH 75: CPT | Performed by: HOSPITALIST

## 2025-03-28 PROCEDURE — 74177 CT ABD & PELVIS W/CONTRAST: CPT | Performed by: EMERGENCY MEDICINE

## 2025-03-28 PROCEDURE — 71045 X-RAY EXAM CHEST 1 VIEW: CPT | Performed by: EMERGENCY MEDICINE

## 2025-03-28 RX ORDER — DEXTROSE MONOHYDRATE, SODIUM CHLORIDE, AND POTASSIUM CHLORIDE 50; 1.49; 4.5 G/1000ML; G/1000ML; G/1000ML
INJECTION, SOLUTION INTRAVENOUS CONTINUOUS
Status: DISCONTINUED | OUTPATIENT
Start: 2025-03-28 | End: 2025-03-31

## 2025-03-28 RX ORDER — MORPHINE SULFATE 2 MG/ML
1 INJECTION, SOLUTION INTRAMUSCULAR; INTRAVENOUS EVERY 2 HOUR PRN
Status: DISCONTINUED | OUTPATIENT
Start: 2025-03-28 | End: 2025-04-01

## 2025-03-28 RX ORDER — MORPHINE SULFATE 2 MG/ML
2 INJECTION, SOLUTION INTRAMUSCULAR; INTRAVENOUS EVERY 2 HOUR PRN
Status: DISCONTINUED | OUTPATIENT
Start: 2025-03-28 | End: 2025-04-01

## 2025-03-28 RX ORDER — INSULIN ASPART 100 [IU]/ML
0.15 INJECTION, SOLUTION INTRAVENOUS; SUBCUTANEOUS ONCE
Status: DISCONTINUED | OUTPATIENT
Start: 2025-03-28 | End: 2025-03-28

## 2025-03-28 RX ORDER — MORPHINE SULFATE 2 MG/ML
2 INJECTION, SOLUTION INTRAMUSCULAR; INTRAVENOUS ONCE
Status: COMPLETED | OUTPATIENT
Start: 2025-03-28 | End: 2025-03-28

## 2025-03-28 RX ORDER — PROCHLORPERAZINE EDISYLATE 5 MG/ML
5 INJECTION INTRAMUSCULAR; INTRAVENOUS ONCE
Status: COMPLETED | OUTPATIENT
Start: 2025-03-28 | End: 2025-03-28

## 2025-03-28 RX ORDER — MULTIVITAMIN
1 TABLET ORAL DAILY
COMMUNITY

## 2025-03-28 RX ORDER — ONDANSETRON 2 MG/ML
4 INJECTION INTRAMUSCULAR; INTRAVENOUS EVERY 4 HOURS PRN
Status: DISCONTINUED | OUTPATIENT
Start: 2025-03-28 | End: 2025-03-28

## 2025-03-28 RX ORDER — ONDANSETRON 2 MG/ML
4 INJECTION INTRAMUSCULAR; INTRAVENOUS ONCE
Status: COMPLETED | OUTPATIENT
Start: 2025-03-28 | End: 2025-03-28

## 2025-03-28 RX ORDER — NICOTINE POLACRILEX 4 MG
30 LOZENGE BUCCAL
Status: DISCONTINUED | OUTPATIENT
Start: 2025-03-28 | End: 2025-04-01

## 2025-03-28 RX ORDER — TAMSULOSIN HYDROCHLORIDE 0.4 MG/1
0.4 CAPSULE ORAL DAILY
COMMUNITY

## 2025-03-28 RX ORDER — MORPHINE SULFATE 4 MG/ML
4 INJECTION, SOLUTION INTRAMUSCULAR; INTRAVENOUS EVERY 2 HOUR PRN
Status: DISCONTINUED | OUTPATIENT
Start: 2025-03-28 | End: 2025-04-01

## 2025-03-28 RX ORDER — DEXTROSE MONOHYDRATE AND SODIUM CHLORIDE 5; .45 G/100ML; G/100ML
INJECTION, SOLUTION INTRAVENOUS CONTINUOUS
Status: ACTIVE | OUTPATIENT
Start: 2025-03-28 | End: 2025-03-28

## 2025-03-28 RX ORDER — ONDANSETRON 2 MG/ML
4 INJECTION INTRAMUSCULAR; INTRAVENOUS EVERY 6 HOURS PRN
Status: DISCONTINUED | OUTPATIENT
Start: 2025-03-28 | End: 2025-04-01

## 2025-03-28 RX ORDER — INSULIN ASPART 100 [IU]/ML
0.1 INJECTION, SOLUTION INTRAVENOUS; SUBCUTANEOUS ONCE
Status: COMPLETED | OUTPATIENT
Start: 2025-03-28 | End: 2025-03-28

## 2025-03-28 RX ORDER — NICOTINE POLACRILEX 4 MG
15 LOZENGE BUCCAL
Status: DISCONTINUED | OUTPATIENT
Start: 2025-03-28 | End: 2025-04-01

## 2025-03-28 RX ORDER — DEXTROSE MONOHYDRATE 25 G/50ML
50 INJECTION, SOLUTION INTRAVENOUS
Status: DISCONTINUED | OUTPATIENT
Start: 2025-03-28 | End: 2025-04-01

## 2025-03-28 RX ORDER — MIDAZOLAM HYDROCHLORIDE 1 MG/ML
0.5 INJECTION INTRAMUSCULAR; INTRAVENOUS ONCE AS NEEDED
Status: ACTIVE | OUTPATIENT
Start: 2025-03-28 | End: 2025-03-28

## 2025-03-28 RX ORDER — AMLODIPINE BESYLATE 5 MG/1
5 TABLET ORAL DAILY
COMMUNITY
Start: 2025-01-06

## 2025-03-28 NOTE — ED PROVIDER NOTES
Patient Seen in: [facility] Emergency Department    History     Chief Complaint   Patient presents with    Abdomen/Flank Pain     Stated Complaint: abdominal pain     HPI    83-year-old male with past medical history of CAD, diabetes, hypertension, dyslipidemia, colon cancer s/p resection and chemotherapy 8 years ago with reportedly unremarkable endoscopic evaluation 5 years ago presenting with daughter for evaluation of mid/epigastric abdominal pain since last evening associate with nausea/vomiting.  No constipation obstipation.  No fevers or chills.  No prior history of similar, no medications aside from Tums prior to arrival.  No sick contacts/recent travel, no new food ingestions or recent antibiotics.  No chest or back pain.  No shortness of breath or exertional complaints.    Past Medical History:    Cancer (HCC)    Coronary atherosclerosis    Diabetes (HCC)    High blood pressure    High cholesterol    Osteoarthritis    Senile paranoid dementia (HCC)       Past Surgical History:   Procedure Laterality Date    Cholecystectomy      Colectomy              No family history on file.    Social History     Socioeconomic History    Marital status:    Tobacco Use    Smoking status: Never    Smokeless tobacco: Never   Vaping Use    Vaping status: Never Used   Substance and Sexual Activity    Alcohol use: Not Currently    Drug use: Never       Review of Systems :  Constitutional: As per HPI  Gastrointestinal: (+) vomiting and abdominal pain.     Positive for stated complaint: abdominal pain  Other systems are as noted in HPI.  Constitutional and vital signs reviewed.      All other systems reviewed and negative except as noted above.    PSFH elements reviewed from today and agreed except as otherwise stated in HPI.    Physical Exam     ED Triage Vitals [03/28/25 1327]   BP (!) 163/75   Pulse 83   Resp 18   Temp 98.2 °F (36.8 °C)   Temp src Oral   SpO2 97 %   O2 Device None (Room air)       Current:BP (!)  163/75   Pulse 83   Temp 98.2 °F (36.8 °C) (Oral)   Resp 18   SpO2 97%         Physical Exam   Constitutional: No distress.   HEENT: MMM.  Head: Normocephalic.   Eyes: No injection.   Cardiovascular: RRR.   Pulmonary/Chest: Effort normal. CTAB.  Abdominal: Soft.  Distended, upper abdominal tenderness without peritonitis and decreased bowel sounds.  Musculoskeletal: No gross deformity.  Neurological: Alert.   Skin: Skin is warm.   Psychiatric: Cooperative.  Nursing note and vitals reviewed.        ED Course     Labs Reviewed   CBC WITH DIFFERENTIAL WITH PLATELET - Abnormal; Notable for the following components:       Result Value    WBC 12.4 (*)     HGB 12.4 (*)     HCT 37.7 (*)     RDW-SD 46.4 (*)     Neutrophil Absolute Prelim 11.38 (*)     Neutrophil Absolute 11.38 (*)     Lymphocyte Absolute 0.33 (*)     All other components within normal limits   BASIC METABOLIC PANEL (8) - Abnormal; Notable for the following components:    Glucose 323 (*)     Chloride 94 (*)     BUN 28 (*)     BUN/CREA Ratio 22.8 (*)     Calculated Osmolality 300 (*)     eGFR-Cr 58 (*)     All other components within normal limits   URINALYSIS, ROUTINE - Abnormal; Notable for the following components:    Glucose Urine >1000 (*)     Ketones Urine 10 (*)     Blood Urine 3+ (*)     Protein Urine 70 (*)     RBC Urine >10 (*)     Squamous Epi. Cells Few (*)     Transitional Cells Few (*)     All other components within normal limits   POCT GLUCOSE - Abnormal; Notable for the following components:    POC Glucose  266 (*)     All other components within normal limits   HEPATIC FUNCTION PANEL (7) - Normal   LIPASE - Normal     ED Course as of 03/28/25 1941  ------------------------------------------------------------  Time: 03/28 1721  Comment: Ambulatory for UA, vomiting despite meds.  ------------------------------------------------------------  Time: 03/28 1941  Comment: CT as noted, patient/daughter updated regarding findings with plan for NGT  placement/admission.       MDM   DIFFERENTIAL DIAGNOSIS: After history and physical exam differential diagnosis includes but is not limited to SBO, perforated viscus, hepatobiliary disease, pancreatitis, gastritis.    Pulse ox: 97%:Normal on RA, as independently interpreted by myself    Medical Decision Making  Evaluation for abdominal pain associate nausea/vomiting send prior colon cancer history status post resection with clinical concern for SBO and nontoxic and nonperitonitic patient - labs as noted, glycemic status improving with IVF/insulin, CT with SBO for which NGT placed and will admit for ongoing management; case d/w on call surgery Dr. Deutsch, on call medicine Dr. Lynch and on call urology Dr. Singh in setting of hematuria/bladder mass with CXR demonstrating NGT to be grossly intragastric.    Problems Addressed:  Bladder mass: undiagnosed new problem with uncertain prognosis  Gross hematuria: acute illness or injury  Hyperglycemia: acute illness or injury  SBO (small bowel obstruction) (HCC): acute illness or injury    Amount and/or Complexity of Data Reviewed  External Data Reviewed: labs and radiology.     Details: 12/2022 labs, 9/2024 chest x-ray reviewed  Labs: ordered. Decision-making details documented in ED Course.  Radiology: ordered and independent interpretation performed. Decision-making details documented in ED Course.     Details: CT abd/pelvis without obvious free air though with SBO as independently interpreted by myself  CXR without obvious pneumothorax and NGT in place as independently interpreted by myself   Discussion of management or test interpretation with external provider(s): Case d/w on call surgery Dr. Deutsch, on call medicine Dr. Lynch and on call urology Dr. Singh    Risk  Prescription drug management.  Parenteral controlled substances.  Decision regarding hospitalization.      I was wearing at minimum a facemask and eye protection throughout this encounter with  handwashing performed prior and after patient evaluation without personal hand/facial/oropharyngeal contact and gloves worn throughout encounter. See note and/or contact this provider for further PPE details.    We recommend that you schedule follow up care with a primary care provider within the next three months to obtain basic health screening including reassessment of your blood pressure.      You had elevated blood pressure today and you need to follow up with your doctor for a repeat blood pressure check and further discussion of lifestyle modifications that include Weight Reduction - Dietary Sodium Restriction - Increased Physical Activity and Moderation in alcohol (ETOH) Consumption. If possible check your pressure at home and keep a blood pressure log to bring to your physician.  Disposition and Plan     Clinical Impression:  1. SBO (small bowel obstruction) (HCC)    2. Gross hematuria    3. Bladder mass    4. Hyperglycemia        Disposition:  Admit    Follow-up:  No follow-up provider specified.    Medications Prescribed:  Current Discharge Medication List

## 2025-03-29 LAB
ANION GAP SERPL CALC-SCNC: 7 MMOL/L (ref 0–18)
BASOPHILS # BLD AUTO: 0.03 X10(3) UL (ref 0–0.2)
BASOPHILS NFR BLD AUTO: 0.4 %
BUN BLD-MCNC: 31 MG/DL (ref 9–23)
BUN/CREAT SERPL: 25.8 (ref 10–20)
CALCIUM BLD-MCNC: 8.7 MG/DL (ref 8.7–10.4)
CHLORIDE SERPL-SCNC: 99 MMOL/L (ref 98–112)
CO2 SERPL-SCNC: 33 MMOL/L (ref 21–32)
CREAT BLD-MCNC: 1.2 MG/DL
DEPRECATED RDW RBC AUTO: 48 FL (ref 35.1–46.3)
EGFRCR SERPLBLD CKD-EPI 2021: 60 ML/MIN/1.73M2 (ref 60–?)
EOSINOPHIL # BLD AUTO: 0.03 X10(3) UL (ref 0–0.7)
EOSINOPHIL NFR BLD AUTO: 0.4 %
ERYTHROCYTE [DISTWIDTH] IN BLOOD BY AUTOMATED COUNT: 14.5 % (ref 11–15)
GLUCOSE BLD-MCNC: 185 MG/DL (ref 70–99)
GLUCOSE BLDC GLUCOMTR-MCNC: 177 MG/DL (ref 70–99)
GLUCOSE BLDC GLUCOMTR-MCNC: 193 MG/DL (ref 70–99)
GLUCOSE BLDC GLUCOMTR-MCNC: 196 MG/DL (ref 70–99)
GLUCOSE BLDC GLUCOMTR-MCNC: 211 MG/DL (ref 70–99)
HCT VFR BLD AUTO: 32.1 %
HGB BLD-MCNC: 10.6 G/DL
IMM GRANULOCYTES # BLD AUTO: 0.03 X10(3) UL (ref 0–1)
IMM GRANULOCYTES NFR BLD: 0.4 %
LYMPHOCYTES # BLD AUTO: 0.74 X10(3) UL (ref 1–4)
LYMPHOCYTES NFR BLD AUTO: 9 %
MCH RBC QN AUTO: 30 PG (ref 26–34)
MCHC RBC AUTO-ENTMCNC: 33 G/DL (ref 31–37)
MCV RBC AUTO: 90.9 FL
MONOCYTES # BLD AUTO: 0.76 X10(3) UL (ref 0.1–1)
MONOCYTES NFR BLD AUTO: 9.3 %
NEUTROPHILS # BLD AUTO: 6.62 X10 (3) UL (ref 1.5–7.7)
NEUTROPHILS # BLD AUTO: 6.62 X10(3) UL (ref 1.5–7.7)
NEUTROPHILS NFR BLD AUTO: 80.5 %
OSMOLALITY SERPL CALC.SUM OF ELEC: 299 MOSM/KG (ref 275–295)
PLATELET # BLD AUTO: 194 10(3)UL (ref 150–450)
POTASSIUM SERPL-SCNC: 4 MMOL/L (ref 3.5–5.1)
RBC # BLD AUTO: 3.53 X10(6)UL
SODIUM SERPL-SCNC: 139 MMOL/L (ref 136–145)
WBC # BLD AUTO: 8.2 X10(3) UL (ref 4–11)

## 2025-03-29 PROCEDURE — 99233 SBSQ HOSP IP/OBS HIGH 50: CPT | Performed by: HOSPITALIST

## 2025-03-29 PROCEDURE — 99223 1ST HOSP IP/OBS HIGH 75: CPT | Performed by: STUDENT IN AN ORGANIZED HEALTH CARE EDUCATION/TRAINING PROGRAM

## 2025-03-29 RX ORDER — HYDRALAZINE HYDROCHLORIDE 20 MG/ML
10 INJECTION INTRAMUSCULAR; INTRAVENOUS EVERY 6 HOURS PRN
Status: DISCONTINUED | OUTPATIENT
Start: 2025-03-29 | End: 2025-04-01

## 2025-03-29 NOTE — PLAN OF CARE
Received patient from ED. Patient is limited English proficient,  utilized. Unable to complete required admission documentation due to patient's orientation level. IVF started. NG tube to LIS. Patient complained of throat pain related to NG tube, MD notified and oral spray ordered. NPO. Q6 accuchecks. Primofit in place, output recorded. SCDs placed for VTE prophylaxis. Fall precautions maintained, bed in lowest position and bed alarm on.    Problem: PAIN - ADULT  Goal: Verbalizes/displays adequate comfort level or patient's stated pain goal  Description: INTERVENTIONS:- Encourage pt to monitor pain and request assistance- Assess pain using appropriate pain scale- Administer analgesics based on type and severity of pain and evaluate response- Implement non-pharmacological measures as appropriate and evaluate response- Consider cultural and social influences on pain and pain management- Manage/alleviate anxiety- Utilize distraction and/or relaxation techniques- Monitor for opioid side effects- Notify MD/LIP if interventions unsuccessful or patient reports new pain- Anticipate increased pain with activity and pre-medicate as appropriate  Outcome: Progressing     Problem: SAFETY ADULT - FALL  Goal: Free from fall injury  Description: INTERVENTIONS:- Assess pt frequently for physical needs- Identify cognitive and physical deficits and behaviors that affect risk of falls.- [place] fall precautions as indicated by assessment.- Educate pt/family on patient safety including physical limitations- Instruct pt to call for assistance with activity based on assessment- Modify environment to reduce risk of injury- Provide assistive devices as appropriate- Consider OT/PT consult to assist with strengthening/mobility- Encourage toileting schedule  Outcome: Progressing     Problem: DISCHARGE PLANNING  Goal: Discharge to home or other facility with appropriate resources  Description: INTERVENTIONS:- Identify barriers to  discharge w/pt and caregiver- Include patient/family/discharge partner in discharge planning- Arrange for needed discharge resources and transportation as appropriate- Identify discharge learning needs (meds, wound care, etc)- Arrange for interpreters to assist at discharge as needed- Consider post-discharge preferences of patient/family/discharge partner- Complete POLST form as appropriate- Assess patient's ability to be responsible for managing their own health- Refer to Case Management Department for coordinating discharge planning if the patient needs post-hospital services based on physician/LIP order or complex needs related to functional status, cognitive ability or social support system  Outcome: Progressing     Problem: Altered Communication/Language Barrier  Goal: Patient/Family is able to understand and participate in their care  Description: Interventions:- Assess communication ability and preferred communication style- Implement communication aides and strategies- Use visual cues when possible- Listen attentively, be patient, do not interrupt- Minimize distractions- Allow time for understanding and response- Establish method for patient to ask for assistance (call light)- Provide an  as needed- Communicate barriers and strategies to overcome with those who interact with patient  Outcome: Progressing     Problem: GASTROINTESTINAL - ADULT  Goal: Minimal or absence of nausea and vomiting  Description: INTERVENTIONS:- Maintain adequate hydration with IV or PO as ordered and tolerated- Nasogastric tube to low intermittent suction as ordered- Evaluate effectiveness of ordered antiemetic medications- Provide nonpharmacologic comfort measures as appropriate- Advance diet as tolerated, if ordered- Obtain nutritional consult as needed- Evaluate fluid balance  Outcome: Progressing

## 2025-03-29 NOTE — PROGRESS NOTES
[facility]  part of [facility]    Progress Note    Uli Fountain Patient Status:  Inpatient    5/3/1940 MRN [id number]   Location [facility] 4W/SW/SE Attending Dillan Barros MD   Hosp Day # 1 PCP Angela Ryan MD     Chief Complaint:     SBO    Subjective:   Subjective:    Patient seen and examined  Hypertensive this morning.   Low grade fever will monitor.   Endorsing abdominal distension n/v    Objective:   Blood pressure (!) 165/58, pulse 72, temperature 99.4 °F (37.4 °C), temperature source Oral, resp. rate 18, weight 181 lb 8 oz (82.3 kg), SpO2 93%.  Physical Exam    General: Patient is alert and oriented x3 does not appear to be in acute distress at this time  HEENT: EOMI PERRLA, atraumatic normocephalic  Cardiac: S1-S2 appreciated  Lungs: Good air entry bilaterally clear to auscultation  Abdomen: Soft nontender distension positive bowel sounds  Ext: Peripheral pulses are positive  Neuro: No focal deficits noted  Psych: Normal mood  Skin: No rashes noted  MSK: Full range of motion intact      Results:   Lab Results   Component Value Date    WBC 8.2 2025    HGB 10.6 (L) 2025    HCT 32.1 (L) 2025    .0 2025    CREATSERUM 1.20 2025    BUN 31 (H) 2025     2025    K 4.0 2025    CL 99 2025    CO2 33.0 (H) 2025     (H) 2025    CA 8.7 2025    ALB 4.5 2025    ALKPHO 78 2025    BILT 0.8 2025    TP 7.1 2025    AST 24 2025    ALT 22 2025    LIP 23 2025    DDIMER 0.78 2022    CRP 3.80 (H) 2022    MG 1.3 (L) 2022     2022       XR CHEST AP PORTABLE  (CPT=71045)    Result Date: 3/28/2025  CONCLUSION:   Well-positioned enteric tube.  Prominence of the interstitial markings with small bibasilar opacities, which likely reflects interstitial lung disease with a superimposed infectious process felt to be less  likely.     Dictated by (CST): Waylon Ambrose MD on 3/28/2025 at 8:29 PM     Finalized by (CST): Waylon Ambrose MD on 3/28/2025 at 8:33 PM          CT ABDOMEN+PELVIS(CONTRAST ONLY)(CPT=74177)    Result Date: 3/28/2025  CONCLUSION:   1. Small bowel obstruction with focal transition point involving the left posterior paramedian abdomen. 2. No acute appendicitis, acute diverticulitis, pneumoperitoneum, or fluid collection. 3. Postoperative changes involving the colon with unremarkable appearance of the colorectal anastomosis. 4. A 1.5 cm mass arising from the left bladder dome, which is concerning for urothelial carcinoma.  Recommend correlation with cystoscopy. 5. Mild circumferential bladder wall thickening, which may be secondary to underdistention, chronic outlet obstruction, or cystitis. 6. No hydronephrosis or urinary calculus. 7. Postoperative changes from prior cholecystectomy with mild intrahepatic biliary ductal dilatation. 8. Mild compression fracture deformity at T12 and minimal compression fracture deformity at T10, which are age indeterminate. 9. Lesser incidental findings described above.     Dictated by (CST): Waylon Ambrose MD on 3/28/2025 at 7:05 PM     Finalized by (CST): Waylon Ambrose MD on 3/28/2025 at 7:14 PM               Assessment & Plan:     SBO (small bowel obstruction) (HCC)  - NGT placed   - no acute surgical intervention indicated.   - appreciate surgical recs   - NGT today.   - plan to repeat imaging tomorrow.   - Continue IVF, IV analgesics, IV antiemetics PRN  - will monitor closely.     Gross hematuria  -found to have a bladder mass  -Urology placed on consult  -appreciate recs  -will continue to monitor closely  -will require cysto but pending SBO .    DM type II  -Hyperglycemia  -continue accuchecks   -titrate meds as needed  -supplement with sliding scale.     Accelerated HTN  -will start on IV hydralazine 10mg Q6 prn SBP > 165  -monitor and titrate     CAD  -currently  stable    VTE ppx: holding in the setting of hematuria.    Global A/P  -Reviewed previous consultant notes  -Reviewed CBC, BMP, Mag, and Phos  -Reviewed tests ordered  -Repeat labs in am  -MDM: High, severe exacerbation of chronic illness posing a threat to life. IV medications requiring close inpatient monitoring.       **Certification      PHYSICIAN Certification of Need for Inpatient Hospitalization - Initial Certification    Patient will require inpatient services that will reasonably be expected to span two midnight's based on the clinical documentation in H+P.   Based on patients current state of illness, I anticipate that, after discharge, patient will require TBD.      Dillan Barros MD

## 2025-03-29 NOTE — PLAN OF CARE
NGT remains LIS per surgery. NPO. IVF as ordered. Up in chair and walking halls; PT/OT to see for eval. Continue conservative measures - plan for SBFT in AM.     Problem: PAIN - ADULT  Goal: Verbalizes/displays adequate comfort level or patient's stated pain goal  Description: INTERVENTIONS:- Encourage pt to monitor pain and request assistance- Assess pain using appropriate pain scale- Administer analgesics based on type and severity of pain and evaluate response- Implement non-pharmacological measures as appropriate and evaluate response- Consider cultural and social influences on pain and pain management- Manage/alleviate anxiety- Utilize distraction and/or relaxation techniques- Monitor for opioid side effects- Notify MD/LIP if interventions unsuccessful or patient reports new pain- Anticipate increased pain with activity and pre-medicate as appropriate  Outcome: Progressing     Problem: SAFETY ADULT - FALL  Goal: Free from fall injury  Description: INTERVENTIONS:- Assess pt frequently for physical needs- Identify cognitive and physical deficits and behaviors that affect risk of falls.- [place] fall precautions as indicated by assessment.- Educate pt/family on patient safety including physical limitations- Instruct pt to call for assistance with activity based on assessment- Modify environment to reduce risk of injury- Provide assistive devices as appropriate- Consider OT/PT consult to assist with strengthening/mobility- Encourage toileting schedule  Outcome: Progressing     Problem: DISCHARGE PLANNING  Goal: Discharge to home or other facility with appropriate resources  Description: INTERVENTIONS:- Identify barriers to discharge w/pt and caregiver- Include patient/family/discharge partner in discharge planning- Arrange for needed discharge resources and transportation as appropriate- Identify discharge learning needs (meds, wound care, etc)- Arrange for interpreters to assist at discharge as needed- Consider  post-discharge preferences of patient/family/discharge partner- Complete POLST form as appropriate- Assess patient's ability to be responsible for managing their own health- Refer to Case Management Department for coordinating discharge planning if the patient needs post-hospital services based on physician/LIP order or complex needs related to functional status, cognitive ability or social support system  Outcome: Progressing     Problem: Altered Communication/Language Barrier  Goal: Patient/Family is able to understand and participate in their care  Description: Interventions:- Assess communication ability and preferred communication style- Implement communication aides and strategies- Use visual cues when possible- Listen attentively, be patient, do not interrupt- Minimize distractions- Allow time for understanding and response- Establish method for patient to ask for assistance (call light)- Provide an  as needed- Communicate barriers and strategies to overcome with those who interact with patient  Outcome: Progressing     Problem: GASTROINTESTINAL - ADULT  Goal: Minimal or absence of nausea and vomiting  Description: INTERVENTIONS:- Maintain adequate hydration with IV or PO as ordered and tolerated- Nasogastric tube to low intermittent suction as ordered- Evaluate effectiveness of ordered antiemetic medications- Provide nonpharmacologic comfort measures as appropriate- Advance diet as tolerated, if ordered- Obtain nutritional consult as needed- Evaluate fluid balance  Outcome: Progressing     Problem: Diabetes/Glucose Control  Goal: Glucose maintained within prescribed range  Description: INTERVENTIONS:- Monitor Blood Glucose as ordered- Assess for signs and symptoms of hyperglycemia and hypoglycemia- Administer ordered medications to maintain glucose within target range- Assess barriers to adequate nutritional intake and initiate nutrition consult as needed- Instruct patient on self management of  diabetes  Outcome: Progressing     Problem: Patient Centered Care  Goal: Patient preferences are identified and integrated in the patient's plan of care  Description: Interventions:- What would you like us to know as we care for you? - Provide timely, complete, and accurate information to patient/family- Incorporate patient and family knowledge, values, beliefs, and cultural backgrounds into the planning and delivery of care- Encourage patient/family to participate in care and decision-making at the level they choose- Honor patient and family perspectives and choices  Outcome: Progressing     Problem: Patient/Family Goals  Goal: Patient/Family Long Term Goal  Description: Patient's Long Term Goal: Interventions:- - See additional Care Plan goals for specific interventions  Outcome: Progressing  Goal: Patient/Family Short Term Goal  Description: Patient's Short Term Goal: Interventions: - - See additional Care Plan goals for specific interventions  Outcome: Progressing

## 2025-03-29 NOTE — CONSULTS
[facility]  Report of Consultation    Uli Fountain Patient Status:  Inpatient    5/3/1940 MRN [id number]   Location [facility] 4W/SW/SE Attending Dillan Barros MD   Hosp Day # 1 PCP Angela Ryan MD     Reason for Consultation:  Small bowel obstruction    History of Present Illness:  Uli Fountain is a 84 year old male who presents to [facility] on 3/28/2025 with complaints of abdominal distention, pain, nausea, and vomiting for the past day. He has a history of colon cancer s/p resection and chemotherapy, colostomy creation and takedown, and cholecystectomy. CT A/P revealed a small bowel obstruction along with a 1.5cm mass arising from the left bladder dome. WBC 8.2, afebrile on exam. NGT was placed in ED.    Past medical history  DM, HTN, dementia    Past abdominal surgical history as above    History:  Past Medical History:    Cancer (HCC)    Coronary atherosclerosis    Diabetes (HCC)    High blood pressure    High cholesterol    Osteoarthritis    Senile paranoid dementia (HCC)     Past Surgical History:   Procedure Laterality Date    Cholecystectomy      Colectomy       No family history on file.   reports that he has never smoked. He has never used smokeless tobacco. He reports that he does not currently use alcohol. He reports that he does not use drugs.    Allergies:  Allergies[1]    Medications:  Medications Prior to Admission   Medication Sig    amLODIPine 5 MG Oral Tab Take 1 tablet (5 mg total) by mouth daily.    Brexpiprazole 0.5 MG Oral Tab Take 1 tablet by mouth daily.    tamsulosin 0.4 MG Oral Cap Take 1 capsule (0.4 mg total) by mouth daily.    Cholecalciferol (VITAMIN D-3 OR) Take 1 capsule by mouth daily.    Multiple Vitamin (MULTI-VITAMIN) Oral Tab Take 1 tablet by mouth daily.    Cyanocobalamin (B-12 OR) Take 1 tablet by mouth daily.    aspirin (ASPIRIN 81) 81 MG Oral Tab EC Take 1 tablet (81 mg total) by mouth 2 (two) times  daily.    pioglitazone 15 MG Oral Tab Take 1 tablet (15 mg total) by mouth daily.    metFORMIN HCl 1000 MG Oral Tab Take 1 tablet (1,000 mg total) by mouth 2 (two) times daily with meals.    glimepiride 4 MG Oral Tab Take 1 tablet (4 mg total) by mouth 2 (two) times daily with meals.    losartan 100 MG Oral Tab Take 1 tablet (100 mg total) by mouth daily.    Glucosamine HCl (GLUCOSAMINE OR) Take 1 tablet by mouth daily.    atorvastatin 80 MG Oral Tab Take 1 tablet (80 mg total) by mouth nightly.         Current Facility-Administered Medications:     phenol (Chloraseptic) 1.4 % oral liquid spray, , Oral, Q2H PRN    potassium chloride 20 mEq in dextrose 5%-sodium chloride 0.45% 1000mL infusion premix, , Intravenous, Continuous    morphINE PF 2 MG/ML injection 1 mg, 1 mg, Intravenous, Q2H PRN **OR** morphINE PF 2 MG/ML injection 2 mg, 2 mg, Intravenous, Q2H PRN **OR** morphINE PF 4 MG/ML injection 4 mg, 4 mg, Intravenous, Q2H PRN    ondansetron (Zofran) 4 MG/2ML injection 4 mg, 4 mg, Intravenous, Q6H PRN    glucose (Dex4) 15 GM/59ML oral liquid 15 g, 15 g, Oral, Q15 Min PRN **OR** glucose (Glutose) 40% oral gel 15 g, 15 g, Oral, Q15 Min PRN **OR** glucose-vitamin C (Dex-4) chewable tab 4 tablet, 4 tablet, Oral, Q15 Min PRN **OR** dextrose 50% injection 50 mL, 50 mL, Intravenous, Q15 Min PRN **OR** glucose (Dex4) 15 GM/59ML oral liquid 30 g, 30 g, Oral, Q15 Min PRN **OR** glucose (Glutose) 40% oral gel 30 g, 30 g, Oral, Q15 Min PRN **OR** glucose-vitamin C (Dex-4) chewable tab 8 tablet, 8 tablet, Oral, Q15 Min PRN    insulin regular human (Novolin R, Humulin R) 100 UNIT/ML injection 1-11 Units, 1-11 Units, Subcutaneous, 4 times per day    Review of Systems:  Review of Systems   Constitutional:  Negative for chills, diaphoresis and fever.   Respiratory:  Negative for shortness of breath.    Cardiovascular:  Negative for chest pain.   Gastrointestinal:  Positive for abdominal pain, constipation and abdominal distention.  Negative for nausea and vomiting.   Neurological:  Negative for weakness.       Physical Exam:  /60 (BP Location: Right arm)   Pulse 74   Temp 99.4 °F (37.4 °C) (Oral)   Resp 18   Wt 181 lb 8 oz (82.3 kg)   SpO2 93%   BMI 26.80 kg/m²   Physical Exam  Vitals reviewed.   Constitutional:       General: He is not in acute distress.     Appearance: Normal appearance.   HENT:      Head: Normocephalic and atraumatic.      Right Ear: External ear normal.      Left Ear: External ear normal.      Nose: Nose normal.   Pulmonary:      Effort: Pulmonary effort is normal. No respiratory distress.   Abdominal:      General: There is distension.      Palpations: Abdomen is soft.      Tenderness: There is no abdominal tenderness. There is no guarding or rebound.   Neurological:      Mental Status: He is alert. Mental status is at baseline.   Psychiatric:         Mood and Affect: Mood normal.         Behavior: Behavior normal.         Thought Content: Thought content normal.         Judgment: Judgment normal.         Laboratory Data:  Recent Labs   Lab 03/28/25  1537 03/29/25  0720   RBC 4.14 3.53*   HGB 12.4* 10.6*   HCT 37.7* 32.1*   MCV 91.1 90.9   MCH 30.0 30.0   MCHC 32.9 33.0   RDW 14.0 14.5   NEPRELIM 11.38* 6.62   WBC 12.4* 8.2   .0 194.0       Recent Labs   Lab 03/28/25  1536 03/29/25  0720   * 185*   BUN 28* 31*   CREATSERUM 1.23 1.20   CA 9.5 8.7   ALB 4.5  --     139   K 4.5 4.0   CL 94* 99   CO2 28.0 33.0*   ALKPHO 78  --    AST 24  --    ALT 22  --    BILT 0.8  --    TP 7.1  --          No results for input(s): \"PTP\", \"INR\", \"PTT\" in the last 168 hours.      XR CHEST AP PORTABLE  (CPT=71045)    Result Date: 3/28/2025  CONCLUSION:   Well-positioned enteric tube.  Prominence of the interstitial markings with small bibasilar opacities, which likely reflects interstitial lung disease with a superimposed infectious process felt to be less likely.     Dictated by (CST): Waylon Ambrose MD on  3/28/2025 at 8:29 PM     Finalized by (CST): Waylon Ambrose MD on 3/28/2025 at 8:33 PM          CT ABDOMEN+PELVIS(CONTRAST ONLY)(CPT=74177)    Result Date: 3/28/2025  CONCLUSION:   1. Small bowel obstruction with focal transition point involving the left posterior paramedian abdomen. 2. No acute appendicitis, acute diverticulitis, pneumoperitoneum, or fluid collection. 3. Postoperative changes involving the colon with unremarkable appearance of the colorectal anastomosis. 4. A 1.5 cm mass arising from the left bladder dome, which is concerning for urothelial carcinoma.  Recommend correlation with cystoscopy. 5. Mild circumferential bladder wall thickening, which may be secondary to underdistention, chronic outlet obstruction, or cystitis. 6. No hydronephrosis or urinary calculus. 7. Postoperative changes from prior cholecystectomy with mild intrahepatic biliary ductal dilatation. 8. Mild compression fracture deformity at T12 and minimal compression fracture deformity at T10, which are age indeterminate. 9. Lesser incidental findings described above.     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  Decompress with NGT today, plan SBFT tomorrow to better evaluate obstruction  NPO except ice chips and sips of clears for  comfort  Analgesics and antiemetics as needed  Patient seen with Dr. Deutsch who can provide additional recommendations as needed.    Waylon Hernández PA-C  3/29/2025  10:36 AM                           [1] No Known Allergies

## 2025-03-29 NOTE — ED QUICK NOTES
PCT to transport patient. Patients family aware of plan of care, verbalizes understanding. Brought to floor with belongings. NG tube clamped for transport.

## 2025-03-29 NOTE — H&P
[facility]    PATIENT'S NAME: CHUCKY SHAY   ATTENDING PHYSICIAN: Jorge Zhu MD   PATIENT ACCOUNT#:   [id number]    LOCATION:  [address]  MEDICAL RECORD #:   [id number]       YOB: 1940  ADMISSION DATE:       03/28/2025    HISTORY AND PHYSICAL EXAMINATION    CHIEF COMPLAINT:  Small bowel obstruction.    HISTORY OF PRESENT ILLNESS:  The patient is an 84-year-old  male who presented to the emergency department for evaluation of abdominal distention, nausea, vomiting since last night.  CBC showed white blood cell count of 12.4 with left shift.  Chemistry showed GFR 58, chloride 94, potassium 4.5, glucose 323.  CT scan of the abdomen and pelvis showed small bowel obstruction with focal transition point involving the left posterior paramedian abdomen.  No acute appendicitis or diverticulitis.  No pneumoperitoneum.  There is 1.5 cm mass arising from the left lateral dome which is concerning for urothelial carcinoma.  The patient has an NG tube placed and started on IV fluids, pain medications.  He will be admitted to the hospital for further management.    PAST MEDICAL HISTORY:  Diabetes mellitus type 2, hypertension, hyperlipidemia, dementia, coronary artery disease status post LAD PCI stent, generalized osteoarthritis.    PAST SURGICAL HISTORY:  He had a low anterior resection for colon cancer plus chemotherapy.  He had colostomy and then colostomy takedown/reversal.  History of cholecystectomy.    MEDICATIONS:  Please see medication reconciliation list.    ALLERGIES:  No known drug allergies.    FAMILY HISTORY:  Positive for hypertension, heart disease, and diabetes mellitus type 2.    SOCIAL HISTORY:  No tobacco, alcohol, or drug use.  He lives with his family.  Independent in his basic activities of daily living.    REVIEW OF SYSTEMS:  Intractable crescendo decrescendo abdominal pain since last night associated with multiple episodes of nausea and vomiting.  He had a  very small bowel movement earlier today.  The patient also reported that when he went to the bathroom earlier today, he saw blood in his urine.  Other 12-point review of systems is negative.    PHYSICAL EXAMINATION:  GENERAL:  Alert and oriented to time, place, and person.  Moderate distress.  VITAL SIGNS:  Temperature 98.2, pulse 85, respiratory rate 18, blood pressure 184/76, pulse ox 94% on room air.  HEENT:  Atraumatic.  Oropharynx clear.  Dry mucous membranes.  Ears, nose normal.  Eyes:  Anicteric sclerae.  NECK:  Supple.  No lymphadenopathy.  Trachea midline.  Full range of motion.  LUNGS:  Clear to auscultation bilaterally.  Normal respiratory effort.  HEART:  Regular rate, rhythm.  S1 and S2 auscultated.  No murmur.  ABDOMEN:  Soft.  Moderate distention.  Tympanic to percussion.  Hypoactive bowel sounds.  EXTREMITIES:  No peripheral edema, clubbing, or cyanosis.  NEUROLOGIC:  Motor and sensory intact.    ASSESSMENT:  1.   Acute small bowel obstruction, transition point, involving left posterior paramedian abdomen.  2.   Bladder dome mass, suggestive of urothelial carcinoma, reported history of hematuria.  3.   Essential hypertension.  4.   Coronary artery disease.  5.   Diabetes mellitus type 2.    PLAN:  Patient will be admitted to general medical floor.  IV fluids.  NG tube to low intermittent suction.  Pain and nausea control.  Obtain general surgery and urology consult.  Monitor Accu-Cheks, sliding scale insulin.    Dictated By Fabio Lynch MD  d: 03/28/2025 19:29:18  t: 03/28/2025 19:52:42  Job [id number]  FB/

## 2025-03-29 NOTE — ED QUICK NOTES
Orders for admission, patient is aware of plan and ready to go upstairs. Any questions, please call ED RN Jenna at extension [phone].     Patient Covid vaccination status: Fully vaccinated     COVID Test Ordered in ED: None    COVID Suspicion at Admission: N/A    Running Infusions:      Mental Status/LOC at time of transport: Aox2    Other pertinent information:   CIWA score: N/A   NIH score:  N/A         Regarding: Left Arm, changing colors, in pain, under arm is numb.  Forearm is getting hard.  ----- Message from Roseline Gilbert sent at 7/28/2017  9:57 PM CDT -----  Patient Name: Rafael Stein  Specialist or PCP:Dr. Serna  Pregnant (If Yes, how long?): No  Symptoms:Left Arm, changing colors, in pain, under arm is numb.  Forearm is getting hard.   Call Back #:[phone]  Is the patient’s permanent residence located in [place], [place], or a [facility]? Yes [facility] [id number]  Call Center Account #:[id number]

## 2025-03-29 NOTE — CONSULTS
[facility]      IDENTIFYING DATA  Patient is Uli Fountain a 84 year old male. MRN is [id number]    Admitting physician: Dillan Barros MD  Primary care physician: Angela Ryan MD    CHIEF COMPLAINT  Chief Complaint   Patient presents with    Abdomen/Flank Pain             HISTORY OF PRESENT ILLNESS  84 year old male presents with abdominal distension, nausea/vomiting.  Found to have SBO on CT scan.  He has a history of colon cancer s/p resection and chemotherapy.  Currently with NGT in place for decompression.  Patient notes that his abdomen is feeling slightly better today.    CT scan in the ER showed incidental finding of possible small bladder mass ~1.5 cm.  Patient does note that he has observed some gross hematuria although he's unable to be specific about how long he has been noticing that.  He says he wakes up 4 times to urinate at night.    Daughter notes he was supposed to see a urologist anyway because he had a recent PSA that was elevated.    PAST UROLOGICAL HISTORY BY ORGAN SYSTEM  See HPI    PAST MEDICAL HISTORY  Past Medical History:    Cancer (HCC)    Coronary atherosclerosis    Diabetes (HCC)    High blood pressure    High cholesterol    Osteoarthritis    Senile paranoid dementia (HCC)             PAST SURGICAL HISTORY  Past Surgical History:   Procedure Laterality Date    Cholecystectomy      Colectomy         PAST FAMILY HISTORY  No family history on file.    PAST SOCIAL HISTORY  Social History     Socioeconomic History    Marital status:      Spouse name: Not on file    Number of children: Not on file    Years of education: Not on file    Highest education level: Not on file   Occupational History    Not on file   Tobacco Use    Smoking status: Never    Smokeless tobacco: Never   Vaping Use    Vaping status: Never Used   Substance and Sexual Activity    Alcohol use: Not Currently    Drug use: Never    Sexual activity: Not on file   Other  Topics Concern    Not on file   Social History Narrative    Not on file     Social Drivers of Health     Food Insecurity: Patient Declined (3/29/2025)    NCSS - Food Insecurity     Worried About Running Out of Food in the Last Year: Patient declined     Ran Out of Food in the Last Year: Patient declined   Transportation Needs: Patient Declined (3/29/2025)    NCSS - Transportation     Lack of Transportation: Patient declined   Housing Stability: Unknown (3/29/2025)    NCSS - Housing/Utilities     Has Housing: Yes     Worried About Losing Housing: Patient declined     Unable to Get Utilities: Patient declined       MEDICATIONS  No current outpatient medications on file.    ALLERGIES  Allergies[1]       REVIEW OF SYSTEMS  Constitutional symptoms:(-) fever, (-) chills (-) headache  Eyes: (-) blurred vision, (-) double vision   Neurological: (-) tremors, (-) dizzy spells  (-) numbness/tingling  Endocrine: (-) feeling too hot/cold (-)  tired/sluggish  Gastrointestinal:(+)  abdominal pain   (+) nausea/vomiting (-) indigestion  Cardiovascular: (-) chest pain, (-) palpitations (-) HTN  Integumentary: (-)  skin rash (-) persistent itch  Musculoskeletal: (-) joint pain, (-) neck pain (-) back pain  Ear/Nose/Throat/Mouth:  (-) sore throat (-) sinus problems  Genitourinary:see HPI  Respiratory:  (-) problems  wheezing (-) frequent cough (-) SOB (-) QUILES  Hematologic/Lymphatic: (-)  swollen glands (-) blood clotting problem    PHYSICAL EXAMINATIONS    Vital Signs:   Vitals:    03/28/25 2319 03/29/25 0351 03/29/25 0648 03/29/25 1150   BP:  137/66 140/60 (!) 165/58   BP Location:  Right arm Right arm Right arm   Pulse:  70 74 72   Resp:  18 18 18   Temp:  99.8 °F (37.7 °C) 99.4 °F (37.4 °C) 99.4 °F (37.4 °C)   TempSrc:  Oral Oral Oral   SpO2:  93% 93% 93%   Weight: 181 lb 8 oz (82.3 kg)          Constitutional: General appearance: appears well, alert and oriented x 3, pleasant and cooperative.  Neuro: No gross deficits  Skin: Normal  color, turgor  HEENT: NGT in place with bilious output  Chest: Normal respiratory effort  CV: Normal radial pulse  Abdomen: No peritoneal signs  No CVA tenderness bilaterally  Extremities: No edema appreciated.      REVIEW OF LABORATORY, PATHOLOGY, AND RADIOLOGY DATA    CBC w/DIF:   Lab Results   Component Value Date    WBC 8.2 03/29/2025    RBC 3.53 (L) 03/29/2025    HGB 10.6 (L) 03/29/2025    HCT 32.1 (L) 03/29/2025    MCV 90.9 03/29/2025    MCH 30.0 03/29/2025    MCHC 33.0 03/29/2025    RDW 14.5 03/29/2025    .0 03/29/2025       CMP:    Lab Results   Component Value Date    EGFR >60 09/18/2016       PSA:  No components found for: \"GPSAD\"    UA:  No components found for: \"GUCOL\", \"GUCLR\", \"GUSG\", \"GUPH\", \"GUPRO\", \"GUGLUC\", \"GUKET\", \"GUBLD\", \"GUBILI\", \"GUNITR\", \"GULEU\", \"GUURO\", \"GUWBC\", \"GURBC\", \"GUSQEP\", \"GUNSEP\", \"GUBACT\", \"GUAMOR\", \"GUHC\"    Urine culture:  Ordered    Imaging:  XR CHEST AP PORTABLE  (CPT=71045)    Result Date: 3/28/2025  PROCEDURE: XR CHEST AP PORTABLE  (CPT=71045) TIME: 19:51.   COMPARISON: [facility], CT ABDOMEN + PELVIS (CONTRAST ONLY) (CPT=74177), 3/28/2025, 5:54 PM.  [facility], XR CHEST AP PORTABLE (CPT=71045), 9/09/2024, 5:40 PM.  [facility], XR CHEST AP PORTABLE (CPT=71045),  12/17/2022, 7:58 PM.  INDICATIONS: Verify correct tube placement  TECHNIQUE:   Single view.   FINDINGS:  CARDIAC/VASC: The cardiomediastinal silhouette is unchanged in size.  There is atherosclerotic calcification involving the thoracic aorta. MEDIAST/ROSARIO:   Unchanged. LUNGS/PLEURA: There is prominence of the interstitial markings with small bibasilar opacities.  There are low lung volumes.  No pleural effusion.  No pneumothorax. BONES: Multilevel degenerative changes of the thoracic spine.  There is left shoulder degenerative change. OTHER: The enteric tube courses below the diaphragm with the distal tip terminating the body of the stomach.          CONCLUSION:   Well-positioned enteric tube.  Prominence of the interstitial markings with small bibasilar opacities, which likely reflects interstitial lung disease with a superimposed infectious process felt to be less likely.     Dictated by (CST): Waylon Ambrose MD on 3/28/2025 at 8:29 PM     Finalized by (CST): Waylon Ambrose MD on 3/28/2025 at 8:33 PM          CT ABDOMEN+PELVIS(CONTRAST ONLY)(CPT=74177)    Result Date: 3/28/2025  PROCEDURE: CT ABDOMEN + PELVIS (CONTRAST ONLY) (CPT=74177)  COMPARISON: None.  INDICATIONS: abdominal pain with nausea and vomiting  TECHNIQUE: CT images of the abdomen and pelvis were obtained with non-ionic intravenous contrast material.  Automated exposure control for dose reduction was used. Adjustment of the mA and/or kV was done based on the patient's size. Use of iterative reconstruction technique for dose reduction was used.  Dose information is transmitted to the ACR (American College of Radiology) NRDR (National Radiology Data Registry) which includes the Dose Index Registry.  FINDINGS:  LOWER CHEST: There are peripheral reticulations involving both lungs.  No honeycombing.  There are coronary artery calcifications.  Mild left atrial enlargement.  HEPATOBILIARY:   No enlargement, atrophy, abnormal density, or significant focal lesion. The gallbladder is surgically absent.  Mild prominence of the intrahepatic biliary tree.  The common bile duct is normal in caliber.  SPLEEN:   No enlargement or focal lesion.   PANCREAS:   There is fatty atrophy of the pancreas.  No pancreatic ductal dilatation or solid mass.  ADRENALS:   No mass or enlargement.   GENITOURINARY:   No hydronephrosis or urinary calculus.  No enhancing renal mass.  There are subcentimeter left renal cysts.  There is circumferential bladder wall thickening.  There is a 1.5 x 1.4 cm mass involving the left bladder dome (6:56, 5:52, 2:126).  GI TRACT:    There is a small bowel obstruction with focal transition  point in the posterior left paramedian hemiabdomen (5:51).  The visualized thoracic esophagus is distended and fluid-filled (2:2).  The stomach is distended.  The duodenum is dilated and measures up to 4.9 cm in diameter (5:41).  Multiple loops of jejunum are dilated measuring up to 3.8 cm (5:42).  There is fecalization of contents of the small bowel proximal to the transition point (2:117).  The loops of ileum distal to the transition point are not dilated.  No acute appendicitis.  No acute diverticulitis.  There is an anastomosis at the colorectal junction (2:121).  PELVIC ORGANS: The prostate is upper limits of normal in size and measures 3.9 cm in the transverse plane.  AORTA/VASCULAR:   No aneurysm or dissection.  There is atherosclerotic calcification of the abdominal aorta and its branching vessels.  RETROPERITONEUM:   No mass or enlarged adenopathy.   PERITONEUM: No pneumoperitoneum or ascites.  LYMPH NODES: No evidence of lymphadenopathy.   BONES:  Minimal age indeterminate superior endplate compression fracture deformity at T10.  Mild superior and inferior endplate compression fracture deformity at T12.  Multilevel degenerative changes of the lower thoracic and lumbar spine, which are most  advanced at L3-L4.  Mild-to-moderate multifocal degenerative change involving the osseous pelvis.  OTHER:   Trace fat containing umbilical hernia.  Trace fat containing left inguinal hernia.         CONCLUSION:   1. Small bowel obstruction with focal transition point involving the left posterior paramedian abdomen. 2. No acute appendicitis, acute diverticulitis, pneumoperitoneum, or fluid collection. 3. Postoperative changes involving the colon with unremarkable appearance of the colorectal anastomosis. 4. A 1.5 cm mass arising from the left bladder dome, which is concerning for urothelial carcinoma.  Recommend correlation with cystoscopy. 5. Mild circumferential bladder wall thickening, which may be secondary to  underdistention, chronic outlet obstruction, or cystitis. 6. No hydronephrosis or urinary calculus. 7. Postoperative changes from prior cholecystectomy with mild intrahepatic biliary ductal dilatation. 8. Mild compression fracture deformity at T12 and minimal compression fracture deformity at T10, which are age indeterminate. 9. Lesser incidental findings described above.     Dictated by (CST): Waylon Ambrose MD on 3/28/2025 at 7:05 PM     Finalized by (CST): Waylon Ambrose MD on 3/28/2025 at 7:14 PM            ASSESSMENT AND PLAN   84 year old male with history of colon cancer s/p chemo/resection.  Presents with SBO.  Also with incidental finding of small bladder mass on CT.  He notes intermittent gross hematuria recently.      Patient will ultimately need cystoscopy/possible TURBT.  Will determine timing. If he ultimately requires surgical intervention for SBO could potentially coordinate cysto in same surgical setting.      Elevated PSA (per patient's daughter).  She recalls the PSA was about 7. In light of his age, would favor more conservative approach.  Would need to obtain records to further evaluate.    Thank you for this consult.    Mariela Singh MD  [facility]   O: [phone]               [1] No Known Allergies

## 2025-03-29 NOTE — PROGRESS NOTES
Patient confused oriented x2 this morning. Daughter, Britt, called for update. She states she spoke with pt on the phone this morning and endorses that he is very confused and has hx of dementia and paranoia. Update provided to Britt for plan of care. States her brother will be coming in later today to be with patient. Updates/Plan of Care please call Britt/Audelia (daughters) reached at [phone].     Will reorient and redirect patient this morning. NGT to LIS. Remains NPO for surgical and urology consults. Language Line at bedside for translation.

## 2025-03-30 ENCOUNTER — APPOINTMENT (OUTPATIENT)
Dept: GENERAL RADIOLOGY | Facility: HOSPITAL | Age: 85
End: 2025-03-30
Payer: MEDICARE

## 2025-03-30 LAB
ANION GAP SERPL CALC-SCNC: 8 MMOL/L (ref 0–18)
BASOPHILS # BLD AUTO: 0.03 X10(3) UL (ref 0–0.2)
BASOPHILS NFR BLD AUTO: 0.4 %
BUN BLD-MCNC: 24 MG/DL (ref 9–23)
BUN/CREAT SERPL: 22.9 (ref 10–20)
C DIFF TOX B STL QL: NEGATIVE
CALCIUM BLD-MCNC: 8.3 MG/DL (ref 8.7–10.4)
CHLORIDE SERPL-SCNC: 106 MMOL/L (ref 98–112)
CO2 SERPL-SCNC: 27 MMOL/L (ref 21–32)
CREAT BLD-MCNC: 1.05 MG/DL
DEPRECATED RDW RBC AUTO: 49 FL (ref 35.1–46.3)
EGFRCR SERPLBLD CKD-EPI 2021: 70 ML/MIN/1.73M2 (ref 60–?)
EOSINOPHIL # BLD AUTO: 0.1 X10(3) UL (ref 0–0.7)
EOSINOPHIL NFR BLD AUTO: 1.3 %
ERYTHROCYTE [DISTWIDTH] IN BLOOD BY AUTOMATED COUNT: 14.6 % (ref 11–15)
GLUCOSE BLD-MCNC: 200 MG/DL (ref 70–99)
GLUCOSE BLDC GLUCOMTR-MCNC: 158 MG/DL (ref 70–99)
GLUCOSE BLDC GLUCOMTR-MCNC: 188 MG/DL (ref 70–99)
GLUCOSE BLDC GLUCOMTR-MCNC: 306 MG/DL (ref 70–99)
HCT VFR BLD AUTO: 31.7 %
HGB BLD-MCNC: 10.4 G/DL
IMM GRANULOCYTES # BLD AUTO: 0.03 X10(3) UL (ref 0–1)
IMM GRANULOCYTES NFR BLD: 0.4 %
LYMPHOCYTES # BLD AUTO: 0.89 X10(3) UL (ref 1–4)
LYMPHOCYTES NFR BLD AUTO: 11.8 %
MAGNESIUM SERPL-MCNC: 1.5 MG/DL (ref 1.6–2.6)
MCH RBC QN AUTO: 30.2 PG (ref 26–34)
MCHC RBC AUTO-ENTMCNC: 32.8 G/DL (ref 31–37)
MCV RBC AUTO: 92.2 FL
MONOCYTES # BLD AUTO: 0.8 X10(3) UL (ref 0.1–1)
MONOCYTES NFR BLD AUTO: 10.6 %
NEUTROPHILS # BLD AUTO: 5.68 X10 (3) UL (ref 1.5–7.7)
NEUTROPHILS # BLD AUTO: 5.68 X10(3) UL (ref 1.5–7.7)
NEUTROPHILS NFR BLD AUTO: 75.5 %
OSMOLALITY SERPL CALC.SUM OF ELEC: 302 MOSM/KG (ref 275–295)
PHOSPHATE SERPL-MCNC: 3.2 MG/DL (ref 2.4–5.1)
PLATELET # BLD AUTO: 192 10(3)UL (ref 150–450)
POTASSIUM SERPL-SCNC: 3.6 MMOL/L (ref 3.5–5.1)
RBC # BLD AUTO: 3.44 X10(6)UL
SODIUM SERPL-SCNC: 141 MMOL/L (ref 136–145)
WBC # BLD AUTO: 7.5 X10(3) UL (ref 4–11)

## 2025-03-30 PROCEDURE — 74250 X-RAY XM SM INT 1CNTRST STD: CPT

## 2025-03-30 PROCEDURE — 99233 SBSQ HOSP IP/OBS HIGH 50: CPT | Performed by: HOSPITALIST

## 2025-03-30 NOTE — PROGRESS NOTES
[facility]  part of [facility]    Progress Note    Uli Fountain Patient Status:  Inpatient    5/3/1940 MRN [id number]   Location [facility] 4W/SW/SE Attending Dillan Barros MD   Hosp Day # 2 PCP Angela Ryan MD     Chief Complaint:     SBO    Subjective:   Subjective:    Patient seen and examined  Hypertensive this morning.   NGT fell out overnight  No acute events overnight    Objective:   Blood pressure 123/58, pulse 69, temperature 98.8 °F (37.1 °C), temperature source Oral, resp. rate 20, weight 181 lb 8 oz (82.3 kg), SpO2 100%.  Physical Exam    General: Patient is alert and oriented x3 does not appear to be in acute distress at this time  HEENT: EOMI PERRLA, atraumatic normocephalic  Cardiac: S1-S2 appreciated  Lungs: Good air entry bilaterally clear to auscultation  Abdomen: Soft nontender distension positive bowel sounds  Ext: Peripheral pulses are positive  Neuro: No focal deficits noted  Psych: Normal mood  Skin: No rashes noted  MSK: Full range of motion intact      Results:   Lab Results   Component Value Date    WBC 7.5 2025    HGB 10.4 (L) 2025    HCT 31.7 (L) 2025    .0 2025    CREATSERUM 1.05 2025    BUN 24 (H) 2025     2025    K 3.6 2025     2025    CO2 27.0 2025     (H) 2025    CA 8.3 (L) 2025    ALB 4.5 2025    ALKPHO 78 2025    BILT 0.8 2025    TP 7.1 2025    AST 24 2025    ALT 22 2025    LIP 23 2025    DDIMER 0.78 2022    CRP 3.80 (H) 2022    MG 1.5 (L) 2025    PHOS 3.2 2025     2022       XR CHEST AP PORTABLE  (CPT=71045)    Result Date: 3/28/2025  CONCLUSION:   Well-positioned enteric tube.  Prominence of the interstitial markings with small bibasilar opacities, which likely reflects interstitial lung disease with a superimposed infectious process felt to be less  likely.     Dictated by (CST): Waylon Ambrose MD on 3/28/2025 at 8:29 PM     Finalized by (CST): Waylon Ambrose MD on 3/28/2025 at 8:33 PM          CT ABDOMEN+PELVIS(CONTRAST ONLY)(CPT=74177)    Result Date: 3/28/2025  CONCLUSION:   1. Small bowel obstruction with focal transition point involving the left posterior paramedian abdomen. 2. No acute appendicitis, acute diverticulitis, pneumoperitoneum, or fluid collection. 3. Postoperative changes involving the colon with unremarkable appearance of the colorectal anastomosis. 4. A 1.5 cm mass arising from the left bladder dome, which is concerning for urothelial carcinoma.  Recommend correlation with cystoscopy. 5. Mild circumferential bladder wall thickening, which may be secondary to underdistention, chronic outlet obstruction, or cystitis. 6. No hydronephrosis or urinary calculus. 7. Postoperative changes from prior cholecystectomy with mild intrahepatic biliary ductal dilatation. 8. Mild compression fracture deformity at T12 and minimal compression fracture deformity at T10, which are age indeterminate. 9. Lesser incidental findings described above.     Dictated by (CST): Waylon Ambrose MD on 3/28/2025 at 7:05 PM     Finalized by (CST): Waylon Ambrose MD on 3/28/2025 at 7:14 PM               Assessment & Plan:     SBO (small bowel obstruction) (HCC)  - NGT placed   - no acute surgical intervention indicated.   - appreciate surgical recs   - NGT today.   - plan to repeat imaging tomorrow.   - Continue IVF, IV analgesics, IV antiemetics PRN  - will monitor closely.     Gross hematuria  -found to have a bladder mass  -Urology placed on consult  -appreciate recs  -will continue to monitor closely  -will require cysto but pending SBO .    DM type II  -Hyperglycemia  -continue accuchecks   -titrate meds as needed  -supplement with sliding scale.     Accelerated HTN  -will start on IV hydralazine 10mg Q6 prn SBP > 165  -monitor and titrate     CAD  -currently  stable    VTE ppx: holding in the setting of hematuria.    Global A/P  -hypomagnesemia - replete  -SBFT today - Pending.   -appreciate surgical recs - will continue to monitor.   -Reviewed previous consultant notes  -Reviewed CBC, BMP, Mag, and Phos  -Reviewed tests ordered  -Repeat labs in am  -MDM: High, severe exacerbation of chronic illness posing a threat to life. IV medications requiring close inpatient monitoring.       Dillan Barros MD

## 2025-03-30 NOTE — PLAN OF CARE
Patient is AxO2-3 with increased confusion overnight. Patient pulled out NGT.  Ok to leave out NGT per MD. Patient denies nausea/vomiting. Pt had a smear BM overnight. NPO except ice chips/sips of clears. Monitoring blood sugar per order.  Denies pain. Hydralazine prn for hypertension. Voiding per primofit. IVF infusing. Ambulating x1 assist + RW. PT/OT eval pending. Plan for SBFT today. Appropriate safety measures maintained, call light within reach, and frequent rounding.        Problem: PAIN - ADULT  Goal: Verbalizes/displays adequate comfort level or patient's stated pain goal  Description: INTERVENTIONS:- Encourage pt to monitor pain and request assistance- Assess pain using appropriate pain scale- Administer analgesics based on type and severity of pain and evaluate response- Implement non-pharmacological measures as appropriate and evaluate response- Consider cultural and social influences on pain and pain management- Manage/alleviate anxiety- Utilize distraction and/or relaxation techniques- Monitor for opioid side effects- Notify MD/LIP if interventions unsuccessful or patient reports new pain- Anticipate increased pain with activity and pre-medicate as appropriate  Outcome: Progressing     Problem: SAFETY ADULT - FALL  Goal: Free from fall injury  Description: INTERVENTIONS:- Assess pt frequently for physical needs- Identify cognitive and physical deficits and behaviors that affect risk of falls.- [place] fall precautions as indicated by assessment.- Educate pt/family on patient safety including physical limitations- Instruct pt to call for assistance with activity based on assessment- Modify environment to reduce risk of injury- Provide assistive devices as appropriate- Consider OT/PT consult to assist with strengthening/mobility- Encourage toileting schedule  Outcome: Progressing     Problem: DISCHARGE PLANNING  Goal: Discharge to home or other facility with appropriate resources  Description:  INTERVENTIONS:- Identify barriers to discharge w/pt and caregiver- Include patient/family/discharge partner in discharge planning- Arrange for needed discharge resources and transportation as appropriate- Identify discharge learning needs (meds, wound care, etc)- Arrange for interpreters to assist at discharge as needed- Consider post-discharge preferences of patient/family/discharge partner- Complete POLST form as appropriate- Assess patient's ability to be responsible for managing their own health- Refer to Case Management Department for coordinating discharge planning if the patient needs post-hospital services based on physician/LIP order or complex needs related to functional status, cognitive ability or social support system  Outcome: Progressing     Problem: Altered Communication/Language Barrier  Goal: Patient/Family is able to understand and participate in their care  Description: Interventions:- Assess communication ability and preferred communication style- Implement communication aides and strategies- Use visual cues when possible- Listen attentively, be patient, do not interrupt- Minimize distractions- Allow time for understanding and response- Establish method for patient to ask for assistance (call light)- Provide an  as needed- Communicate barriers and strategies to overcome with those who interact with patient  Outcome: Progressing     Problem: GASTROINTESTINAL - ADULT  Goal: Minimal or absence of nausea and vomiting  Description: INTERVENTIONS:- Maintain adequate hydration with IV or PO as ordered and tolerated- Nasogastric tube to low intermittent suction as ordered- Evaluate effectiveness of ordered antiemetic medications- Provide nonpharmacologic comfort measures as appropriate- Advance diet as tolerated, if ordered- Obtain nutritional consult as needed- Evaluate fluid balance  Outcome: Progressing     Problem: Diabetes/Glucose Control  Goal: Glucose maintained within prescribed  range  Description: INTERVENTIONS:- Monitor Blood Glucose as ordered- Assess for signs and symptoms of hyperglycemia and hypoglycemia- Administer ordered medications to maintain glucose within target range- Assess barriers to adequate nutritional intake and initiate nutrition consult as needed- Instruct patient on self management of diabetes  Outcome: Progressing     Problem: Patient Centered Care  Goal: Patient preferences are identified and integrated in the patient's plan of care  Description: Interventions:- What would you like us to know as we care for you? - Provide timely, complete, and accurate information to patient/family- Incorporate patient and family knowledge, values, beliefs, and cultural backgrounds into the planning and delivery of care- Encourage patient/family to participate in care and decision-making at the level they choose- Honor patient and family perspectives and choices  Outcome: Progressing     Problem: Patient/Family Goals  Goal: Patient/Family Long Term Goal  Description: Patient's Long Term Goal: Interventions:-- See additional Care Plan goals for specific interventions  Outcome: Progressing  Goal: Patient/Family Short Term Goal  Description: Patient's Short Term Goal: Interventions: - - See additional Care Plan goals for specific interventions  Outcome: Progressing

## 2025-03-30 NOTE — PROGRESS NOTES
[facility]  Progress Note    Uli Fountain Patient Status:  Inpatient    5/3/1940 MRN [id number]   Location [facility] 4W/SW/SE Attending Dillan Barros MD   Hosp Day # 2 PCP Angela Ryan MD     Subjective:  Pt seen sitting in chair. NGT fell out overnight, no nausea or vomiting. Reports scant flatus, no bms. Plan for SBFT today.    Objective/Physical Exam:  General: Alert, orientated x3.  Cooperative.  No apparent distress.  Vital Signs:  Blood pressure 147/49, pulse 79, temperature 98.5 °F (36.9 °C), temperature source Oral, resp. rate 18, weight 181 lb 8 oz (82.3 kg), SpO2 93%.  Wt Readings from Last 3 Encounters:   25 181 lb 8 oz (82.3 kg)   24 175 lb (79.4 kg)   24 179 lb 3.7 oz (81.3 kg)     Lungs: No respiratory distress.  Cardiac: Regular rate and rhythm.   Abdomen:  Soft, mild distended, non tender, with no rebound or guarding.  No peritoneal signs.   Extremities:  No lower extremity edema noted.      Intake/Output:    Intake/Output Summary (Last 24 hours) at 3/30/2025 0953  Last data filed at 3/30/2025 0606  Gross per 24 hour   Intake 2510 ml   Output 875 ml   Net 1635 ml     I/O last 3 completed shifts:  In: 3334.3 [P.O.:200; I.V.:3134.3]  Out: 1910 [Urine:935; Emesis/NG output:975]  No intake/output data recorded.    Medications:    magnesium sulfate  4 g Intravenous Once    insulin regular human  1-11 Units Subcutaneous 4 times per day       Labs:  Lab Results   Component Value Date    WBC 7.5 2025    HGB 10.4 2025    HCT 31.7 2025    .0 2025     Lab Results   Component Value Date     2025    K 3.6 2025     2025    CO2 27.0 2025    BUN 24 2025    CREATSERUM 1.05 2025     2025    CA 8.3 2025     No results found for: \"PT\", \"INR\"      Assessment  Patient Active Problem List   Diagnosis    Angina of effort    Coronary artery disease of native  artery of native heart with stable angina pectoris    Preoperative evaluation to rule out surgical contraindication    Status post total knee replacement, 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 1/19/2022    COVID-19    Confusion    Small bowel obstruction (HCC)    SBO (small bowel obstruction) (HCC)    Gross hematuria    Bladder mass    Hyperglycemia     Small bowel obstruction, likely adhesive    Plan:  NGT out, okay to leave out unless patient develops nausea or vomiting  SBFT today, will follow results  NPO except ice chips and sips of clears  Analgesics and antiemetics as needed  Ambulate and up to chair    Quality:  DVT Mechanical Prophylaxis:   SCDs,    DVT Pharmacologic Prophylaxis   Medication   None                Code Status: Not on file  Pleitez: External urinary catheter in place  Pleitez Duration (in days):   Central line:    LIZA: 3/31/2025        Waylon Hernández PA-C  3/30/2025  9:53 AM        SBFT reviewed, no evidence of SBO. Contrast in colon with rapid transit. OK for clear liquids      Kelly Deutsch MD  [facility] - General Surgery   [address] [place] [address]  p [phone]

## 2025-03-30 NOTE — PHYSICAL THERAPY NOTE
PHYSICAL THERAPY EVALUATION - INPATIENT     Room Number: 470/470-A  Evaluation Date: 3/30/2025  Type of Evaluation: Initial   Physician Order: PT Eval and Treat    Presenting Problem: SBO  Co-Morbidities : Diabetes mellitus type 2, hypertension, hyperlipidemia, dementia, coronary artery disease status post LAD PCI stent, generalized osteoarthritis.  Reason for Therapy: Mobility Dysfunction and Discharge Planning    PHYSICAL THERAPY ASSESSMENT   Patient is a 84 year old male admitted 3/28/2025 for SBO.  Prior to admission, patient's baseline is independent without a device for ambulation, needs supervision with hx of paranoid dementia, helps care for his spouse who has dementia.  Patient is currently functioning near baseline with bed mobility, transfers, gait, and stair negotiation.  Patient is requiring contact guard assist as a result of the following impairments: decreased functional strength, decreased endurance/aerobic capacity, impaired dynamic balance, and decreased muscular endurance.  Physical Therapy will continue to follow for duration of hospitalization.    Patient will benefit from continued skilled PT Services at discharge to promote prior level of function and safety with additional support and return home with home health PT.    PLAN DURING HOSPITALIZATION  Nursing Mobility Recommendation : 1 Assist  PT Device Recommendation: Rolling walker, Gait belt  PT Treatment Plan: Bed mobility, Body mechanics, Endurance, Energy conservation, Patient education, Family education, Gait training, Strengthening, Stair training, Transfer training, Balance training  Rehab Potential : Good  Frequency (Obs): 5x/week     PHYSICAL THERAPY MEDICAL/SOCIAL HISTORY   History related to current admission: Pt with hx of paranoid dementia, spouse has dementia.      Problem List  Principal Problem:    SBO (small bowel obstruction) (HCC)  Active Problems:    Small bowel obstruction (HCC)    Gross hematuria    Bladder mass     Hyperglycemia      HOME SITUATION  Type of Home: House  Home Layout: One level, Able to live on main level  Stairs to Enter : 6   Railing: Yes    Stairs to Bedroom: 0         Lives With: Daughter, Family    Drives: No   Patient Regularly Uses: Glasses     Prior Level of [place]: Independent with mobility in home without a device, supervision for day to day tasks as pt does have hx dementia    SUBJECTIVE  Pt agreeable to going for a walk.     PHYSICAL THERAPY EXAMINATION   OBJECTIVE  Precautions: Bed/chair alarm,  needed  Fall Risk: Standard fall risk    WEIGHT BEARING RESTRICTION       PAIN ASSESSMENT  Rating:  (Not rated)  Location: abdomen  Management Techniques: Activity promotion, Body mechanics    COGNITION  Orientation Level:  oriented to person    RANGE OF MOTION AND STRENGTH ASSESSMENT  Upper extremity ROM and strength are within functional limits   Lower extremity ROM is within functional limits   Lower extremity strength is with mild functional strength deficits requiring support of walker and CGA    BALANCE  Static Sitting: Fair +  Dynamic Sitting: Fair  Static Standing: Fair -      ACTIVITY TOLERANCE  Pulse: 77  Heart Rate Source: Monitor                   O2 WALK  Oxygen Therapy  SPO2% Ambulation on Room Air: 91    AM-PAC '6-Clicks' INPATIENT SHORT FORM - BASIC MOBILITY  How much difficulty does the patient currently have...  Patient Difficulty: Turning over in bed (including adjusting bedclothes, sheets and blankets)?: A Little   Patient Difficulty: Sitting down on and standing up from a chair with arms (e.g., wheelchair, bedside commode, etc.): A Little   Patient Difficulty: Moving from lying on back to sitting on the side of the bed?: A Little   How much help from another person does the patient currently need...   Help from Another: Moving to and from a bed to a chair (including a wheelchair)?: A Little   Help from Another: Need to walk in hospital room?: A Little   Help from  Another: Climbing 3-5 steps with a railing?: A Little     AM-PAC Score:  Raw Score: 18   Approx Degree of Impairment: 46.58%   Standardized Score (AM-PAC Scale): 43.63   CMS Modifier (G-Code): CK    FUNCTIONAL ABILITY STATUS  Functional Mobility/Gait Assessment  Gait Assistance: Contact guard assist  Distance (ft): 200'  Assistive Device: Rolling walker  Pattern:  (Forward flexed posture, narrow JULIANNE, slow rachel, mild instability)  Sit to Stand: contact guard assist    Exercise/Education Provided:  Energy conservation  Functional activity tolerated  Gait training  Posture  Strengthening  Transfer training  Safety precautions. Role of  IP PT    Skilled Therapy Provided: Pt received sitting up in chair with daughter in room. Pt's daughter and pt agreeable to having daughter provide translation assist as pt is [place] speaking. Pt requires CGA and support of walker for sit <> stand and ambulation. Pt ambulates with narrow JULIANNE and slow rachel. Cues provided for wider JULIANNE and upright posture/looking up. On two occasions pt needs assist navigating around obstacles on L side. Pt encouraged to sit up in chair at end of session and to continue walking with nursing staff and family t/o day to maintain strength and endurance. Pt ended session up in chair with alarm on, all needs in reach and daughter in room. RN updated.     The patient's Approx Degree of Impairment: 46.58% has been calculated based on documentation in the [facility] '6 clicks' Inpatient Basic Mobility Short Form.  Research supports that patients with this level of impairment may benefit from  PT.  Final disposition will be made by interdisciplinary medical team.    Patient End of Session: Up in chair, Needs met, Call light within reach, RN aware of session/findings, All patient questions and concerns addressed, Alarm set, Family present    CURRENT GOALS  Goals to be met by: 4/6/25  Patient Goal Patient's self-stated goal is: return home with HH   Goal #1  Patient is able to demonstrate supine - sit EOB @ level: modified independent     Goal #1   Current Status    Goal #2 Patient is able to demonstrate transfers Sit to/from Stand at assistance level: modified independent with walker - rolling     Goal #2  Current Status    Goal #3 Patient is able to ambulate 200 feet with assist device:  LRAD  at assistance level: modified independent   Goal #3   Current Status    Goal #4 Patient will negotiate 6 stairs/one curb w/ assistive device and supervision   Goal #4   Current Status    Goal #5 Patient to demonstrate independence with home activity/exercise instructions provided to patient in preparation for discharge.   Goal #5   Current Status    Goal #6    Goal #6  Current Status      Patient Evaluation Complexity Level:  History High - 3 or more personal factors and/or co-morbidities   Examination of body systems High - addressing a total of 4 or more elements   Clinical Presentation  Moderate - Evolving   Clinical Decision Making  Moderate Complexity     Gait Trainin minutes  Therapeutic Activity:  12 minutes  PT Mod Complex Eval

## 2025-03-30 NOTE — PLAN OF CARE
Down for SBFT today - advanced to [facility] per surgery in setting of improved imaging. Having multiple loose BMs after contrast study. Ambulating stand by assist; PT eval today. Daughter at bedside during the day today to provide redirection and support to patient. IVF continued as ordered. Tolerating clears.     Problem: PAIN - ADULT  Goal: Verbalizes/displays adequate comfort level or patient's stated pain goal  Description: INTERVENTIONS:- Encourage pt to monitor pain and request assistance- Assess pain using appropriate pain scale- Administer analgesics based on type and severity of pain and evaluate response- Implement non-pharmacological measures as appropriate and evaluate response- Consider cultural and social influences on pain and pain management- Manage/alleviate anxiety- Utilize distraction and/or relaxation techniques- Monitor for opioid side effects- Notify MD/LIP if interventions unsuccessful or patient reports new pain- Anticipate increased pain with activity and pre-medicate as appropriate  Outcome: Progressing     Problem: SAFETY ADULT - FALL  Goal: Free from fall injury  Description: INTERVENTIONS:- Assess pt frequently for physical needs- Identify cognitive and physical deficits and behaviors that affect risk of falls.- [place] fall precautions as indicated by assessment.- Educate pt/family on patient safety including physical limitations- Instruct pt to call for assistance with activity based on assessment- Modify environment to reduce risk of injury- Provide assistive devices as appropriate- Consider OT/PT consult to assist with strengthening/mobility- Encourage toileting schedule  Outcome: Progressing     Problem: DISCHARGE PLANNING  Goal: Discharge to home or other facility with appropriate resources  Description: INTERVENTIONS:- Identify barriers to discharge w/pt and caregiver- Include patient/family/discharge partner in discharge planning- Arrange for needed discharge resources and  transportation as appropriate- Identify discharge learning needs (meds, wound care, etc)- Arrange for interpreters to assist at discharge as needed- Consider post-discharge preferences of patient/family/discharge partner- Complete POLST form as appropriate- Assess patient's ability to be responsible for managing their own health- Refer to Case Management Department for coordinating discharge planning if the patient needs post-hospital services based on physician/LIP order or complex needs related to functional status, cognitive ability or social support system  Outcome: Progressing     Problem: Altered Communication/Language Barrier  Goal: Patient/Family is able to understand and participate in their care  Description: Interventions:- Assess communication ability and preferred communication style- Implement communication aides and strategies- Use visual cues when possible- Listen attentively, be patient, do not interrupt- Minimize distractions- Allow time for understanding and response- Establish method for patient to ask for assistance (call light)- Provide an  as needed- Communicate barriers and strategies to overcome with those who interact with patient  Outcome: Progressing     Problem: GASTROINTESTINAL - ADULT  Goal: Minimal or absence of nausea and vomiting  Description: INTERVENTIONS:- Maintain adequate hydration with IV or PO as ordered and tolerated- Nasogastric tube to low intermittent suction as ordered- Evaluate effectiveness of ordered antiemetic medications- Provide nonpharmacologic comfort measures as appropriate- Advance diet as tolerated, if ordered- Obtain nutritional consult as needed- Evaluate fluid balance  Outcome: Progressing     Problem: Diabetes/Glucose Control  Goal: Glucose maintained within prescribed range  Description: INTERVENTIONS:- Monitor Blood Glucose as ordered- Assess for signs and symptoms of hyperglycemia and hypoglycemia- Administer ordered medications to maintain  glucose within target range- Assess barriers to adequate nutritional intake and initiate nutrition consult as needed- Instruct patient on self management of diabetes  Outcome: Progressing     Problem: Patient Centered Care  Goal: Patient preferences are identified and integrated in the patient's plan of care  Description: Interventions:- What would you like us to know as we care for you? - Provide timely, complete, and accurate information to patient/family- Incorporate patient and family knowledge, values, beliefs, and cultural backgrounds into the planning and delivery of care- Encourage patient/family to participate in care and decision-making at the level they choose- Honor patient and family perspectives and choices  Outcome: Progressing     Problem: Patient/Family Goals  Goal: Patient/Family Long Term Goal  Description: Patient's Long Term Goal: Interventions:- - See additional Care Plan goals for specific interventions  Outcome: Progressing  Goal: Patient/Family Short Term Goal  Description: Patient's Short Term Goal: Interventions: -- See additional Care Plan goals for specific interventions  Outcome: Progressing

## 2025-03-30 NOTE — CM/SW NOTE
Anticipated therapy need: Home with Home Healthcare    Tent ref sent, f2f done for RN PT OT    CM/SW to followup    Plan  Pending    / to remain available for support and/or discharge planning.     Dolly Siddiqui RN    Ext [phone]

## 2025-03-31 LAB
ANION GAP SERPL CALC-SCNC: 7 MMOL/L (ref 0–18)
BASOPHILS # BLD AUTO: 0.03 X10(3) UL (ref 0–0.2)
BASOPHILS NFR BLD AUTO: 0.5 %
BUN BLD-MCNC: 21 MG/DL (ref 9–23)
BUN/CREAT SERPL: 20.6 (ref 10–20)
CALCIUM BLD-MCNC: 7.7 MG/DL (ref 8.7–10.4)
CHLORIDE SERPL-SCNC: 105 MMOL/L (ref 98–112)
CO2 SERPL-SCNC: 28 MMOL/L (ref 21–32)
CREAT BLD-MCNC: 1.02 MG/DL
DEPRECATED RDW RBC AUTO: 49.6 FL (ref 35.1–46.3)
EGFRCR SERPLBLD CKD-EPI 2021: 73 ML/MIN/1.73M2 (ref 60–?)
EOSINOPHIL # BLD AUTO: 0.31 X10(3) UL (ref 0–0.7)
EOSINOPHIL NFR BLD AUTO: 5.5 %
ERYTHROCYTE [DISTWIDTH] IN BLOOD BY AUTOMATED COUNT: 14.6 % (ref 11–15)
GLUCOSE BLD-MCNC: 163 MG/DL (ref 70–99)
GLUCOSE BLDC GLUCOMTR-MCNC: 100 MG/DL (ref 70–99)
GLUCOSE BLDC GLUCOMTR-MCNC: 125 MG/DL (ref 70–99)
GLUCOSE BLDC GLUCOMTR-MCNC: 165 MG/DL (ref 70–99)
GLUCOSE BLDC GLUCOMTR-MCNC: 211 MG/DL (ref 70–99)
GLUCOSE BLDC GLUCOMTR-MCNC: 289 MG/DL (ref 70–99)
HCT VFR BLD AUTO: 30.2 %
HGB BLD-MCNC: 9.7 G/DL
IMM GRANULOCYTES # BLD AUTO: 0.03 X10(3) UL (ref 0–1)
IMM GRANULOCYTES NFR BLD: 0.5 %
LYMPHOCYTES # BLD AUTO: 0.92 X10(3) UL (ref 1–4)
LYMPHOCYTES NFR BLD AUTO: 16.5 %
MAGNESIUM SERPL-MCNC: 2 MG/DL (ref 1.6–2.6)
MCH RBC QN AUTO: 29.8 PG (ref 26–34)
MCHC RBC AUTO-ENTMCNC: 32.1 G/DL (ref 31–37)
MCV RBC AUTO: 92.6 FL
MONOCYTES # BLD AUTO: 0.54 X10(3) UL (ref 0.1–1)
MONOCYTES NFR BLD AUTO: 9.7 %
NEUTROPHILS # BLD AUTO: 3.76 X10 (3) UL (ref 1.5–7.7)
NEUTROPHILS # BLD AUTO: 3.76 X10(3) UL (ref 1.5–7.7)
NEUTROPHILS NFR BLD AUTO: 67.3 %
OSMOLALITY SERPL CALC.SUM OF ELEC: 297 MOSM/KG (ref 275–295)
PHOSPHATE SERPL-MCNC: 2.8 MG/DL (ref 2.4–5.1)
PLATELET # BLD AUTO: 180 10(3)UL (ref 150–450)
POTASSIUM SERPL-SCNC: 3.6 MMOL/L (ref 3.5–5.1)
RBC # BLD AUTO: 3.26 X10(6)UL
SODIUM SERPL-SCNC: 140 MMOL/L (ref 136–145)
WBC # BLD AUTO: 5.6 X10(3) UL (ref 4–11)

## 2025-03-31 PROCEDURE — 99233 SBSQ HOSP IP/OBS HIGH 50: CPT | Performed by: HOSPITALIST

## 2025-03-31 RX ORDER — SODIUM CHLORIDE 9 MG/ML
INJECTION, SOLUTION INTRAVENOUS CONTINUOUS
Status: DISCONTINUED | OUTPATIENT
Start: 2025-03-31 | End: 2025-04-01

## 2025-03-31 NOTE — PLAN OF CARE
Patient alert and oriented, primarily [place] speaking. Family at bedside. Advanced to full liquid diet - well tolerated. Denies pain/nausea. Vital signs stable on room air. Ambulating with standby assist and rolling walker. Voiding freely, multiple bowel movements. Excoriation noted, sensicare applied. IV fluids continued. ACHS. Call light within reach, fall precautions in place, patient calling appropriately.     Problem: PAIN - ADULT  Goal: Verbalizes/displays adequate comfort level or patient's stated pain goal  Description: INTERVENTIONS:- Encourage pt to monitor pain and request assistance- Assess pain using appropriate pain scale- Administer analgesics based on type and severity of pain and evaluate response- Implement non-pharmacological measures as appropriate and evaluate response- Consider cultural and social influences on pain and pain management- Manage/alleviate anxiety- Utilize distraction and/or relaxation techniques- Monitor for opioid side effects- Notify MD/LIP if interventions unsuccessful or patient reports new pain- Anticipate increased pain with activity and pre-medicate as appropriate  Outcome: Progressing     Problem: SAFETY ADULT - FALL  Goal: Free from fall injury  Description: INTERVENTIONS:- Assess pt frequently for physical needs- Identify cognitive and physical deficits and behaviors that affect risk of falls.- [place] fall precautions as indicated by assessment.- Educate pt/family on patient safety including physical limitations- Instruct pt to call for assistance with activity based on assessment- Modify environment to reduce risk of injury- Provide assistive devices as appropriate- Consider OT/PT consult to assist with strengthening/mobility- Encourage toileting schedule  Outcome: Progressing     Problem: DISCHARGE PLANNING  Goal: Discharge to home or other facility with appropriate resources  Description: INTERVENTIONS:- Identify barriers to discharge w/pt and caregiver- Include  patient/family/discharge partner in discharge planning- Arrange for needed discharge resources and transportation as appropriate- Identify discharge learning needs (meds, wound care, etc)- Arrange for interpreters to assist at discharge as needed- Consider post-discharge preferences of patient/family/discharge partner- Complete POLST form as appropriate- Assess patient's ability to be responsible for managing their own health- Refer to Case Management Department for coordinating discharge planning if the patient needs post-hospital services based on physician/LIP order or complex needs related to functional status, cognitive ability or social support system  Outcome: Progressing     Problem: Altered Communication/Language Barrier  Goal: Patient/Family is able to understand and participate in their care  Description: Interventions:- Assess communication ability and preferred communication style- Implement communication aides and strategies- Use visual cues when possible- Listen attentively, be patient, do not interrupt- Minimize distractions- Allow time for understanding and response- Establish method for patient to ask for assistance (call light)- Provide an  as needed- Communicate barriers and strategies to overcome with those who interact with patient  Outcome: Progressing     Problem: GASTROINTESTINAL - ADULT  Goal: Minimal or absence of nausea and vomiting  Description: INTERVENTIONS:- Maintain adequate hydration with IV or PO as ordered and tolerated- Nasogastric tube to low intermittent suction as ordered- Evaluate effectiveness of ordered antiemetic medications- Provide nonpharmacologic comfort measures as appropriate- Advance diet as tolerated, if ordered- Obtain nutritional consult as needed- Evaluate fluid balance  Outcome: Progressing     Problem: Diabetes/Glucose Control  Goal: Glucose maintained within prescribed range  Description: INTERVENTIONS:- Monitor Blood Glucose as ordered- Assess for  signs and symptoms of hyperglycemia and hypoglycemia- Administer ordered medications to maintain glucose within target range- Assess barriers to adequate nutritional intake and initiate nutrition consult as needed- Instruct patient on self management of diabetes  Outcome: Progressing     Problem: Patient Centered Care  Goal: Patient preferences are identified and integrated in the patient's plan of care  Description: Interventions:- What would you like us to know as we care for you? - Provide timely, complete, and accurate information to patient/family- Incorporate patient and family knowledge, values, beliefs, and cultural backgrounds into the planning and delivery of care- Encourage patient/family to participate in care and decision-making at the level they choose- Honor patient and family perspectives and choices  Outcome: Progressing

## 2025-03-31 NOTE — PHYSICAL THERAPY NOTE
PHYSICAL THERAPY TREATMENT NOTE - INPATIENT     Room Number: 470/470-A       Presenting Problem: SBO  Co-Morbidities : Diabetes mellitus type 2, hypertension, hyperlipidemia, dementia, coronary artery disease status post LAD PCI stent, generalized osteoarthritis.    Problem List  Principal Problem:    SBO (small bowel obstruction) (HCC)  Active Problems:    Small bowel obstruction (HCC)    Gross hematuria    Bladder mass    Hyperglycemia      PHYSICAL THERAPY ASSESSMENT   Patient demonstrates good  progress this session, goals  remain in progress.      Patient is requiring stand-by assist and contact guard assist as a result of the following impairments: decreased functional strength, decreased endurance/aerobic capacity, impaired standing dynamic balance, and decreased muscular endurance.     Patient continues to function below baseline with transfers, gait, stair negotiation, maintaining seated position, standing prolonged periods, and performing household tasks.  Next session anticipate patient to progress transfers, gait, stair negotiation, maintaining seated position, standing prolonged periods, and performing household tasks.  Physical Therapy will continue to follow patient for duration of hospitalization.    Patient continues to benefit from continued skilled PT services: at discharge to promote prior level of function and safety with additional support and return home with home health PT.    PLAN DURING HOSPITALIZATION  Nursing Mobility Recommendation : 1 Assist  PT Device Recommendation: Rolling walker, Gait belt  PT Treatment Plan: Bed mobility, Body mechanics, Endurance, Energy conservation, Patient education, Family education, Gait training, Strengthening, Stair training, Transfer training, Balance training  Frequency (Obs): 5x/week     SUBJECTIVE  \"I do my exercises\"     OBJECTIVE  Precautions: Bed/chair alarm,  needed      PAIN ASSESSMENT   Ratin  Location: abdomen  Management  Techniques: Activity promotion, Body mechanics    BALANCE  Static Sitting: Good  Dynamic Sitting: Fair +  Static Standing: Fair  Dynamic Standing: Poor +    ACTIVITY TOLERANCE  Pulse: 82  Heart Rate Source: Monitor                    O2 WALK  Oxygen Therapy  SPO2% Ambulation on Room Air: 98    AM-PAC '6-Clicks' INPATIENT SHORT FORM - BASIC MOBILITY  How much difficulty does the patient currently have...  Patient Difficulty: Turning over in bed (including adjusting bedclothes, sheets and blankets)?: None   Patient Difficulty: Sitting down on and standing up from a chair with arms (e.g., wheelchair, bedside commode, etc.): A Little   Patient Difficulty: Moving from lying on back to sitting on the side of the bed?: None   How much help from another person does the patient currently need...   Help from Another: Moving to and from a bed to a chair (including a wheelchair)?: A Little   Help from Another: Need to walk in hospital room?: A Little   Help from Another: Climbing 3-5 steps with a railing?: A Little     AM-PAC Score:  Raw Score: 20   Approx Degree of Impairment: 35.83%   Standardized Score (AM-PAC Scale): 47.67   CMS Modifier (G-Code): CJ    FUNCTIONAL ABILITY STATUS  Functional Mobility/Gait Assessment  Gait Assistance: Contact guard assist (SBA)  Distance (ft): 300ft  Assistive Device: Rolling walker  Pattern:  (decreased rachel speed, decreased step length, forward flexed posture. Improved fluidity of gait and upright posture and with trial of RW, however, pt likely will not use at home. CG initially improving to SBA as distance increased. Less fluid w/out RW)  Stairs: Stairs  How Many Stairs: 12  Device: 1 Rail  Assist: Contact guard assist  Pattern: Ascend and Descend  Ascend and Descend : Step to  Sit to Stand: stand-by assist. Cues for pacing upon standing to allow body time to acclimate to change in position.     Skilled Therapy Provided: Patient received sitting in bedside chair with family present. RN  approved activity. Coordinated session with OT. Patient primarily Sami speaking- dtr requesting to translate this session.  Therapist educated pt on POC and physiological benefits of mobilization. Cues for activity pacing, energy conservation and pursed lipped breathing techniques that can be applied to home environment. Patient agreeable to participate. Denies pain. *Vitals WNL with activity.     The patient's Approx Degree of Impairment: 35.83% has been calculated based on documentation in the [facility] '6 clicks' Inpatient Daily Activity Short Form.  Research supports that patients with this level of impairment may benefit from home with home PT.  Final disposition will be made by interdisciplinary medical team.    THERAPEUTIC EXERCISES  Lower Extremity LAQ     Position Sitting       Patient End of Session: Up in chair, Needs met, Call light within reach, RN aware of session/findings, Family present    CURRENT GOALS   Goals to be met by: 4/6/25  Patient Goal Patient's self-stated goal is: return home with HH   Goal #1 Patient is able to demonstrate supine - sit EOB @ level: modified independent     Goal #1   Current Status progressing   Goal #2 Patient is able to demonstrate transfers Sit to/from Stand at assistance level: modified independent with walker - rolling     Goal #2  Current Status progressing   Goal #3 Patient is able to ambulate 200 feet with assist device: LRAD at assistance level: modified independent   Goal #3   Current Status progressing   Goal #4 Patient will negotiate 6 stairs/one curb w/ assistive device and supervision   Goal #4   Current Status progressing   Goal #5 Patient to demonstrate independence with home activity/exercise instructions provided to patient in preparation for discharge.   Goal #5   Current Status ongoing     Gait Training: 15 minutes  Therapeutic Activity: 8 minutes

## 2025-03-31 NOTE — PROGRESS NOTES
[facility]  Progress Note    Uli Fountain Patient Status:  Inpatient    5/3/1940 MRN [id number]   Location [facility] 4W/SW/SE Attending Dillan Barros MD   Hosp Day # 3 PCP Angela Ryan MD     Subjective:  Patient is resting comfortably in bed today.  Lao  used upon examination.  Patient denies pain.  Denies nausea, vomiting.  Reports passed flatus and bowel movements yesterday after drinking contrast for SBFT.  Tolerating clear liquids.    Objective/Physical Exam:  General: Alert, orientated x3.  Cooperative.  No apparent distress.  Vital Signs:  Blood pressure 149/52, pulse 81, temperature 98 °F (36.7 °C), temperature source Oral, resp. rate 18, weight 181 lb 8 oz (82.3 kg), SpO2 94%.  Wt Readings from Last 3 Encounters:   25 181 lb 8 oz (82.3 kg)   24 175 lb (79.4 kg)   24 179 lb 3.7 oz (81.3 kg)     Lungs: No respiratory distress.  Cardiac: Regular rate and rhythm.   Abdomen:  Soft,  mildly distended, non tender, with no rebound or guarding.  No peritoneal signs.   Extremities:  No lower extremity edema noted.      Intake/Output:    Intake/Output Summary (Last 24 hours) at 3/31/2025 0846  Last data filed at 3/31/2025 0554  Gross per 24 hour   Intake 2670 ml   Output --   Net 2670 ml     I/O last 3 completed shifts:  In: 3880 [P.O.:240; I.V.:3640]  Out: 400 [Urine:250; Emesis/NG output:150]  No intake/output data recorded.    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 Return to clear liquids if patient develops nausea or vomiting with fulls.   Analgesics and antiemetics as needed.   Ambulate and up to chair.  DVT prophylaxis with SCDs.     Quality:  DVT Mechanical Prophylaxis:   SCDs,    DVT Pharmacologic Prophylaxis   Medication   None                Code Status: Not on file  Pleitez: No urinary catheter in place  Pleitez Duration (in days):   Central line:    LIZA: 3/31/2025        Yenny Hassan PA-C  3/31/2025  8:46 AM

## 2025-03-31 NOTE — PROGRESS NOTES
[facility]  part of [facility]    Progress Note    Uli Founatin Patient Status:  Inpatient    5/3/1940 MRN [id number]   Location [facility] 4W/SW/SE Attending Dillan Barros MD   Hosp Day # 3 PCP Angela Ryan MD     Chief Complaint:     SBO    Subjective:   Subjective:    Patient seen and examined  Hypertensive this morning.   NGT fell out overnight  No acute events overnight    Objective:   Blood pressure 150/62, pulse 63, temperature 98 °F (36.7 °C), temperature source Oral, resp. rate 18, weight 181 lb 8 oz (82.3 kg), SpO2 100%.  Physical Exam    General: Patient is alert and oriented x3 does not appear to be in acute distress at this time  HEENT: EOMI PERRLA, atraumatic normocephalic  Cardiac: S1-S2 appreciated  Lungs: Good air entry bilaterally clear to auscultation  Abdomen: Soft nontender distension positive bowel sounds  Ext: Peripheral pulses are positive  Neuro: No focal deficits noted  Psych: Normal mood  Skin: No rashes noted  MSK: Full range of motion intact      Results:   Lab Results   Component Value Date    WBC 5.6 2025    HGB 9.7 (L) 2025    HCT 30.2 (L) 2025    .0 2025    CREATSERUM 1.02 2025    BUN 21 2025     2025    K 3.6 2025     2025    CO2 28.0 2025     (H) 2025    CA 7.7 (L) 2025    ALB 4.5 2025    ALKPHO 78 2025    BILT 0.8 2025    TP 7.1 2025    AST 24 2025    ALT 22 2025    LIP 23 2025    DDIMER 0.78 2022    CRP 3.80 (H) 2022    MG 2.0 2025    PHOS 2.8 2025     2022       XR SMALL BOWEL SINGLE CONTRAST (CPT=74250)    Result Date: 3/30/2025  CONCLUSION:   No evidence of high-grade small bowel obstruction.  The enteric contrast reaches the colon by 30 minutes.  Dilated small bowel loops measuring up to 4.6 cm.  May represent partial small bowel obstruction  versus ileus.    Dictated by (CST): Noreen Greene MD on 3/30/2025 at 2:43 PM     Finalized by (CST): Noreen Greene MD on 3/30/2025 at 2:46 PM               Assessment & Plan:     SBO (small bowel obstruction) (HCC)  - NGT placed   - no acute surgical intervention indicated.   - appreciate surgical recs   - NGT today.   - plan to repeat imaging tomorrow.   - Continue IVF, IV analgesics, IV antiemetics PRN  - will monitor closely.     Gross hematuria  -found to have a bladder mass  -Urology placed on consult  -appreciate recs  -will continue to monitor closely  -will require cysto but pending SBO .    DM type II  -Hyperglycemia  -continue accuchecks   -titrate meds as needed  -supplement with sliding scale.     Accelerated HTN  -will start on IV hydralazine 10mg Q6 prn SBP > 165  -monitor and titrate     CAD  -currently stable    VTE ppx: holding in the setting of hematuria.    Global A/P  -hypomagnesemia - replete  -advanced diet today  -no urological procedure inhouse - schedule as outpatient  -appreciate surgical recs - will continue to monitor.   -Reviewed previous consultant notes  -Reviewed CBC, BMP, Mag, and Phos  -Reviewed tests ordered  -Repeat labs in am  -MDM: High, severe exacerbation of chronic illness posing a threat to life. IV medications requiring close inpatient monitoring.       Dillan Barros MD

## 2025-03-31 NOTE — OCCUPATIONAL THERAPY NOTE
OCCUPATIONAL THERAPY EVALUATION - INPATIENT     Room Number: 470/470-A  Evaluation Date: 3/31/2025  Type of Evaluation: Quick Eval   Presenting problem: SBO  Co-morbidities: Diabetes mellitus type 2, hypertension, hyperlipidemia, dementia, coronary artery disease status post LAD PCI stent, generalized osteoarthritis    Physician Order: IP Consult to Occupational Therapy  Reason for Therapy: ADL/IADL Dysfunction and Discharge Planning    OCCUPATIONAL THERAPY ASSESSMENT   Patient is a 84 year old male admitted 3/28/2025 for SBO.  Prior to admission, patient's baseline is SUP for ADLs and mobility due to baseline hx dementia.  Patient is currently functioning near baseline with functional transfers, mobility, and adls. Pt demonstrated the skills required to return home with support from family. Pt with no loss of balance during session, good safety awareness, and good carryover of education. No anticipated needs at discharge.       PLAN  Patient has been evaluated and presents with no skilled Occupational Therapy needs  at this time.  Patient will be discharged from Occupational Therapy services. Please re-order if a new functional limitation presents during this admission.    OT Device Recommendations: None    OCCUPATIONAL THERAPY MEDICAL/SOCIAL HISTORY   Problem List  Principal Problem:    SBO (small bowel obstruction) (HCC)  Active Problems:    Small bowel obstruction (HCC)    Gross hematuria    Bladder mass    Hyperglycemia    HOME SITUATION  Type of Home: House  Home Layout: One level; Able to live on main level  Lives With: Daughter; Family  Shower/Tub and Equipment: Walk-in shower; Shower chair  Drives: No  Patient Regularly Uses: Glasses    Prior Level of [place]: Prior to admission pt lived with family who provide SUP for adls and mobility. Pt's daughter reports that pt does not require physical assist with adls and that he often assists his wife with her adls.     SUBJECTIVE  \"I do a bunch of exercises  at home.\"     OCCUPATIONAL THERAPY EXAMINATION    OBJECTIVE  Precautions: Bed/chair alarm;  needed  Fall Risk: Standard fall risk       PAIN ASSESSMENT  Rating: Unable to rate  Location: buttocks  Management Techniques: Activity promotion; Repositioning    ACTIVITY TOLERANCE  Pulse: 82  Heart Rate Source: Monitor                   O2 SATURATIONS  Oxygen Therapy  SPO2% Ambulation on Room Air: 98    COGNITION  Arousal/Alertness:  appropriate responses to stimuli  Following Commands:  follows one step commands consistently    RANGE OF MOTION   Upper extremity ROM is within functional limits     STRENGTH ASSESSMENT  Upper extremity strength is within functional limits     COORDINATION  Gross Motor: WFL   Fine Motor: WFL     ACTIVITIES OF DAILY LIVING ASSESSMENT  AM-PAC ‘6-Clicks’ Inpatient Daily Activity Short Form  How much help from another person does the patient currently need…  -   Putting on and taking off regular lower body clothing?: A Little  -   Bathing (including washing, rinsing, drying)?: A Little  -   Toileting, which includes using toilet, bedpan or urinal? : A Little  -   Putting on and taking off regular upper body clothing?: None  -   Taking care of personal grooming such as brushing teeth?: None  -   Eating meals?: None    AM-PAC Score:  Score: 21  Approx Degree of Impairment: 32.79%  Standardized Score (AM-PAC Scale): 44.27  CMS Modifier (G-Code): CJ    FUNCTIONAL TRANSFER ASSESSMENT  Sit to Stand: Chair  Chair: Stand-by Assist  Simulated toilet transfer: SBA no device     BALANCE ASSESSMENT  Static Sitting: Independent  Static Standing: Stand-by Assist  Dynamic sitting: SUP  Dynamic Standing: SBA     FUNCTIONAL ADL ASSESSMENT  Grooming Seated: Independent  LB Dressing Seated: Stand-by Assist    Skilled Therapy Provided:   RN cleared pt for participation. Session coordinated with PT. Pt received in recliner with family members present. Pt's daughter present throughout session to provide  interpretation PRN. Pt agreeable to participation in therapy. To assess pt's participation during tasks while also providing intermittent education to maximize participation, OT facilitated the following: functional transfers, mobility, and adls tasks. Pt tolerated treatment well and completed all tasks with assist levels listed above. Pt initially using RW for mobility/transfers; able to progress to SBA with no device by end of session. Pt remained in recliner with all needs in reach and family present at end of session. RN aware.     EDUCATION PROVIDED  Patient Education : Role of Occupational Therapy; Plan of Care; Functional Transfer Techniques; Fall Prevention; Posture/Positioning; Proper Body Mechanics; Energy Conservation  Patient's Response to Education: Verbalized Understanding; Returned Demonstration  Family/Caregiver Education : Role of Occupational Therapy; Plan of Care  Family/Caregiver's Response to Education: Verbalized Understanding    The patient's Approx Degree of Impairment: 32.79% has been calculated based on documentation in the [facility] '6 clicks' Inpatient Daily Activity Short Form.  Research supports that patients with this level of impairment may benefit from return home.  Final disposition will be made by interdisciplinary medical team.    Patient End of Session: Up in chair, Needs met, Call light within reach, RN aware of session/findings, All patient questions and concerns addressed, Hospital anti-slip socks, Family present      Patient Evaluation Complexity Level:   Occupational Profile/Medical History MODERATE - Expanded review of history including review of medical or therapy record   Specific performance deficits impacting engagement in ADL/IADL MODERATE  3 - 5 performance deficits   Client Assessment/Performance Deficits MODERATE - Comorbidities and min to mod modifications of tasks    Clinical Decision Making MODERATE - Analysis of occupational profile, detailed assessments, several  treatment options    Overall Complexity MODERATE     OT Session Time: 15 minutes  Self-Care Home Management: 15 minutes

## 2025-03-31 NOTE — PROGRESS NOTES
Brief  Note:    Urine clear yellow.  No apparent plans for surgery.  Will plan on outpatient follow up for management of suspected bladder tumor.    Perla Han MD  [facility]  Office [phone]

## 2025-03-31 NOTE — DISCHARGE INSTRUCTIONS
Sometimes managing your health at home requires assistance.  The [facility] team has recognized your preference to use [facility].  They can be reached by phone at [phone].  The fax number for your reference is [phone]..  A representative from the home health agency will contact you or your family to schedule your first visit.

## 2025-03-31 NOTE — CM/SW NOTE
03/31/25 1200   Choice of Post-Acute Provider   Informed patient of right to choose their preferred provider Yes   List of appropriate post-acute services provided to patient/family with quality data No - Declined list   Patient/family choice [facility]   Information given to Daughter;Guardian/HCPOA/Surrogate     Anticipated therapy need: Home with Home Healthcare    CM called patient's daughter/HCPOA Britt to discuss anticipated therapy need above.  Britt confirms patient/family agreeable to HH arrangement w/ [facility].  CM reserved [facility] via [facility] and notified liaison Jess of choice. Patient discharge instructions updated with [facility] contact information.     / to remain available for support and/or discharge planning.     Plan: Home with [facility] - once medically cleared    Lucía Webster RN, BSN  Nurse   [phone]

## 2025-03-31 NOTE — PLAN OF CARE
Patient is AxO3-4, reoriented as needed.Patient appears to be more lucid.Tolerating CLD. Monitoring blood sugar per order. Denies nausea/vomiting overnight. Multiple loose BMs overnight.  Denies pain. Hydralazine prn for hypertension. Voiding freely. IVF infusing. Ambulating x1 assist + RW. Going home with [facility] upon discharge, pending medical clearance. Patient calls appropriately for assistance. Appropriate safety measures maintained, call light within reach, and frequent rounding.         Problem: PAIN - ADULT  Goal: Verbalizes/displays adequate comfort level or patient's stated pain goal  Description: INTERVENTIONS:- Encourage pt to monitor pain and request assistance- Assess pain using appropriate pain scale- Administer analgesics based on type and severity of pain and evaluate response- Implement non-pharmacological measures as appropriate and evaluate response- Consider cultural and social influences on pain and pain management- Manage/alleviate anxiety- Utilize distraction and/or relaxation techniques- Monitor for opioid side effects- Notify MD/LIP if interventions unsuccessful or patient reports new pain- Anticipate increased pain with activity and pre-medicate as appropriate  3/31/2025 0024 by Goldberg, Rachel, RN  Outcome: Progressing  3/31/2025 0023 by Goldberg, Rachel, RN  Outcome: Progressing     Problem: SAFETY ADULT - FALL  Goal: Free from fall injury  Description: INTERVENTIONS:- Assess pt frequently for physical needs- Identify cognitive and physical deficits and behaviors that affect risk of falls.- [place] fall precautions as indicated by assessment.- Educate pt/family on patient safety including physical limitations- Instruct pt to call for assistance with activity based on assessment- Modify environment to reduce risk of injury- Provide assistive devices as appropriate- Consider OT/PT consult to assist with strengthening/mobility- Encourage toileting schedule  3/31/2025 0024 by Goldberg,  LATONIA Duke  Outcome: Progressing  3/31/2025 0023 by Goldberg, Rachel, RN  Outcome: Progressing     Problem: DISCHARGE PLANNING  Goal: Discharge to home or other facility with appropriate resources  Description: INTERVENTIONS:- Identify barriers to discharge w/pt and caregiver- Include patient/family/discharge partner in discharge planning- Arrange for needed discharge resources and transportation as appropriate- Identify discharge learning needs (meds, wound care, etc)- Arrange for interpreters to assist at discharge as needed- Consider post-discharge preferences of patient/family/discharge partner- Complete POLST form as appropriate- Assess patient's ability to be responsible for managing their own health- Refer to Case Management Department for coordinating discharge planning if the patient needs post-hospital services based on physician/LIP order or complex needs related to functional status, cognitive ability or social support system  3/31/2025 0024 by Goldberg, Rachel, RN  Outcome: Progressing  3/31/2025 0023 by Goldberg, Rachel, RN  Outcome: Progressing     Problem: Altered Communication/Language Barrier  Goal: Patient/Family is able to understand and participate in their care  Description: Interventions:- Assess communication ability and preferred communication style- Implement communication aides and strategies- Use visual cues when possible- Listen attentively, be patient, do not interrupt- Minimize distractions- Allow time for understanding and response- Establish method for patient to ask for assistance (call light)- Provide an  as needed- Communicate barriers and strategies to overcome with those who interact with patient  3/31/2025 0024 by Goldberg, Rachel, RN  Outcome: Progressing  3/31/2025 0023 by Goldberg, Rachel, RN  Outcome: Progressing     Problem: GASTROINTESTINAL - ADULT  Goal: Minimal or absence of nausea and vomiting  Description: INTERVENTIONS:- Maintain adequate hydration with IV  or PO as ordered and tolerated- Nasogastric tube to low intermittent suction as ordered- Evaluate effectiveness of ordered antiemetic medications- Provide nonpharmacologic comfort measures as appropriate- Advance diet as tolerated, if ordered- Obtain nutritional consult as needed- Evaluate fluid balance  3/31/2025 0024 by Goldberg, Rachel, RN  Outcome: Progressing  3/31/2025 0023 by Goldberg, Rachel, RN  Outcome: Progressing     Problem: Diabetes/Glucose Control  Goal: Glucose maintained within prescribed range  Description: INTERVENTIONS:- Monitor Blood Glucose as ordered- Assess for signs and symptoms of hyperglycemia and hypoglycemia- Administer ordered medications to maintain glucose within target range- Assess barriers to adequate nutritional intake and initiate nutrition consult as needed- Instruct patient on self management of diabetes  3/31/2025 0024 by Goldberg, Rachel, RN  Outcome: Progressing  3/31/2025 0023 by Goldberg, Rachel, RN  Outcome: Progressing     Problem: Patient Centered Care  Goal: Patient preferences are identified and integrated in the patient's plan of care  Description: Interventions:- What would you like us to know as we care for you? - Provide timely, complete, and accurate information to patient/family- Incorporate patient and family knowledge, values, beliefs, and cultural backgrounds into the planning and delivery of care- Encourage patient/family to participate in care and decision-making at the level they choose- Honor patient and family perspectives and choices  3/31/2025 0024 by Goldberg, Rachel, RN  Outcome: Progressing  3/31/2025 0023 by Goldberg, Rachel, RN  Outcome: Progressing     Problem: Patient/Family Goals  Goal: Patient/Family Long Term Goal  Description: Patient's Long Term Goal: Interventions:-- See additional Care Plan goals for specific interventions  3/31/2025 0024 by Goldberg, Rachel, RN  Outcome: Progressing  3/31/2025 0023 by Goldberg, Rachel, LATONIA  Outcome:  Progressing  Goal: Patient/Family Short Term Goal  Description: Patient's Short Term Goal: Interventions: - - See additional Care Plan goals for specific interventions  3/31/2025 0024 by Goldberg, Rachel, RN  Outcome: Progressing  3/31/2025 0023 by Goldberg, Rachel, RN  Outcome: Progressing

## 2025-03-31 NOTE — PLAN OF CARE
Problem: PAIN - ADULT  Goal: Verbalizes/displays adequate comfort level or patient's stated pain goal  Description: INTERVENTIONS:- Encourage pt to monitor pain and request assistance- Assess pain using appropriate pain scale- Administer analgesics based on type and severity of pain and evaluate response- Implement non-pharmacological measures as appropriate and evaluate response- Consider cultural and social influences on pain and pain management- Manage/alleviate anxiety- Utilize distraction and/or relaxation techniques- Monitor for opioid side effects- Notify MD/LIP if interventions unsuccessful or patient reports new pain- Anticipate increased pain with activity and pre-medicate as appropriate  Outcome: Progressing     Problem: SAFETY ADULT - FALL  Goal: Free from fall injury  Description: INTERVENTIONS:- Assess pt frequently for physical needs- Identify cognitive and physical deficits and behaviors that affect risk of falls.- [place] fall precautions as indicated by assessment.- Educate pt/family on patient safety including physical limitations- Instruct pt to call for assistance with activity based on assessment- Modify environment to reduce risk of injury- Provide assistive devices as appropriate- Consider OT/PT consult to assist with strengthening/mobility- Encourage toileting schedule  Outcome: Progressing     Problem: DISCHARGE PLANNING  Goal: Discharge to home or other facility with appropriate resources  Description: INTERVENTIONS:- Identify barriers to discharge w/pt and caregiver- Include patient/family/discharge partner in discharge planning- Arrange for needed discharge resources and transportation as appropriate- Identify discharge learning needs (meds, wound care, etc)- Arrange for interpreters to assist at discharge as needed- Consider post-discharge preferences of patient/family/discharge partner- Complete POLST form as appropriate- Assess patient's ability to be responsible for managing  their own health- Refer to Case Management Department for coordinating discharge planning if the patient needs post-hospital services based on physician/LIP order or complex needs related to functional status, cognitive ability or social support system  Outcome: Progressing     Problem: Altered Communication/Language Barrier  Goal: Patient/Family is able to understand and participate in their care  Description: Interventions:- Assess communication ability and preferred communication style- Implement communication aides and strategies- Use visual cues when possible- Listen attentively, be patient, do not interrupt- Minimize distractions- Allow time for understanding and response- Establish method for patient to ask for assistance (call light)- Provide an  as needed- Communicate barriers and strategies to overcome with those who interact with patient  Outcome: Progressing     Problem: GASTROINTESTINAL - ADULT  Goal: Minimal or absence of nausea and vomiting  Description: INTERVENTIONS:- Maintain adequate hydration with IV or PO as ordered and tolerated- Nasogastric tube to low intermittent suction as ordered- Evaluate effectiveness of ordered antiemetic medications- Provide nonpharmacologic comfort measures as appropriate- Advance diet as tolerated, if ordered- Obtain nutritional consult as needed- Evaluate fluid balance  Outcome: Progressing     Problem: Diabetes/Glucose Control  Goal: Glucose maintained within prescribed range  Description: INTERVENTIONS:- Monitor Blood Glucose as ordered- Assess for signs and symptoms of hyperglycemia and hypoglycemia- Administer ordered medications to maintain glucose within target range- Assess barriers to adequate nutritional intake and initiate nutrition consult as needed- Instruct patient on self management of diabetes  Outcome: Progressing     Problem: Patient Centered Care  Goal: Patient preferences are identified and integrated in the patient's plan of  care  Description: Interventions:- What would you like us to know as we care for you? ***- Provide timely, complete, and accurate information to patient/family- Incorporate patient and family knowledge, values, beliefs, and cultural backgrounds into the planning and delivery of care- Encourage patient/family to participate in care and decision-making at the level they choose- Honor patient and family perspectives and choices  Outcome: Progressing     Problem: Patient/Family Goals  Goal: Patient/Family Long Term Goal  Description: Patient's Long Term Goal: ***Interventions:- ***- See additional Care Plan goals for specific interventions  Outcome: Progressing  Goal: Patient/Family Short Term Goal  Description: Patient's Short Term Goal: ***Interventions: - ***- See additional Care Plan goals for specific interventions  Outcome: Progressing

## 2025-04-01 VITALS
DIASTOLIC BLOOD PRESSURE: 63 MMHG | OXYGEN SATURATION: 99 % | HEART RATE: 65 BPM | WEIGHT: 181.5 LBS | RESPIRATION RATE: 18 BRPM | TEMPERATURE: 98 F | SYSTOLIC BLOOD PRESSURE: 159 MMHG | BODY MASS INDEX: 27 KG/M2

## 2025-04-01 LAB
BASOPHILS # BLD AUTO: 0.03 X10(3) UL (ref 0–0.2)
BASOPHILS NFR BLD AUTO: 0.6 %
DEPRECATED RDW RBC AUTO: 49.2 FL (ref 35.1–46.3)
EOSINOPHIL # BLD AUTO: 0.36 X10(3) UL (ref 0–0.7)
EOSINOPHIL NFR BLD AUTO: 7.4 %
ERYTHROCYTE [DISTWIDTH] IN BLOOD BY AUTOMATED COUNT: 14.5 % (ref 11–15)
GLUCOSE BLDC GLUCOMTR-MCNC: 166 MG/DL (ref 70–99)
GLUCOSE BLDC GLUCOMTR-MCNC: 207 MG/DL (ref 70–99)
HCT VFR BLD AUTO: 28.8 %
HGB BLD-MCNC: 9.4 G/DL
IMM GRANULOCYTES # BLD AUTO: 0.02 X10(3) UL (ref 0–1)
IMM GRANULOCYTES NFR BLD: 0.4 %
LYMPHOCYTES # BLD AUTO: 0.78 X10(3) UL (ref 1–4)
LYMPHOCYTES NFR BLD AUTO: 16 %
MAGNESIUM SERPL-MCNC: 1.8 MG/DL (ref 1.6–2.6)
MCH RBC QN AUTO: 30.4 PG (ref 26–34)
MCHC RBC AUTO-ENTMCNC: 32.6 G/DL (ref 31–37)
MCV RBC AUTO: 93.2 FL
MONOCYTES # BLD AUTO: 0.47 X10(3) UL (ref 0.1–1)
MONOCYTES NFR BLD AUTO: 9.7 %
NEUTROPHILS # BLD AUTO: 3.21 X10 (3) UL (ref 1.5–7.7)
NEUTROPHILS # BLD AUTO: 3.21 X10(3) UL (ref 1.5–7.7)
NEUTROPHILS NFR BLD AUTO: 65.9 %
PHOSPHATE SERPL-MCNC: 3.1 MG/DL (ref 2.4–5.1)
PLATELET # BLD AUTO: 172 10(3)UL (ref 150–450)
RBC # BLD AUTO: 3.09 X10(6)UL
WBC # BLD AUTO: 4.9 X10(3) UL (ref 4–11)

## 2025-04-01 PROCEDURE — 99239 HOSP IP/OBS DSCHRG MGMT >30: CPT | Performed by: HOSPITALIST

## 2025-04-01 PROCEDURE — 99231 SBSQ HOSP IP/OBS SF/LOW 25: CPT | Performed by: STUDENT IN AN ORGANIZED HEALTH CARE EDUCATION/TRAINING PROGRAM

## 2025-04-01 RX ORDER — MAGNESIUM OXIDE 400 MG/1
400 TABLET ORAL ONCE
Status: COMPLETED | OUTPATIENT
Start: 2025-04-01 | End: 2025-04-01

## 2025-04-01 NOTE — PROGRESS NOTES
[facility]  Progress Note    Uli Fountain Patient Status:  Inpatient    5/3/1940 MRN [id number]   Location [facility] 4W/SW/SE Attending Dillan Barros MD   Hosp Day # 4 PCP Angela Ryan MD     Subjective:  Pt seen sitting in chair. No nausea or vomiting. Denies abdominal pain. +flatus, small bms. Ambulating. Tolerating full liquids.    Objective/Physical Exam:  General: Alert, orientated x3.  Cooperative.  No apparent distress.  Vital Signs:  Blood pressure 138/59, pulse 75, temperature 98.2 °F (36.8 °C), temperature source Oral, resp. rate 18, weight 181 lb 8 oz (82.3 kg), SpO2 93%.  Wt Readings from Last 3 Encounters:   25 181 lb 8 oz (82.3 kg)   24 175 lb (79.4 kg)   24 179 lb 3.7 oz (81.3 kg)     Lungs: No respiratory distress.  Cardiac: Regular rate and rhythm.   Abdomen:  Soft, non distended, non tender, with no rebound or guarding.  No peritoneal signs.   Extremities:  No lower extremity edema noted.      Intake/Output:    Intake/Output Summary (Last 24 hours) at 2025 0945  Last data filed at 3/31/2025 1946  Gross per 24 hour   Intake 560 ml   Output --   Net 560 ml     I/O last 3 completed shifts:  In:  [P.O.:800; I.V.:1190]  Out: -   No intake/output data recorded.    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 Planning C on discharge  Analgesics and antiemetics as needed.   Ambulate and up to chair.  DVT prophylaxis with SCDs.     Quality:  DVT Mechanical Prophylaxis:   SCDs,    DVT Pharmacologic Prophylaxis   Medication   None                Code Status: Not on file  Pleitez: No urinary catheter in place  Pleitez Duration (in days):   Central line:    LIZA: 4/1/2025        Waylon Hernández PA-C  4/1/2025  9:45 AM

## 2025-04-01 NOTE — PLAN OF CARE
Patient is AxO4, can be forgetful at times, reoriented as needed.Tolerating FLD. Accuchecks AC/HS. Denies nausea/vomiting overnight. Denies pain. Hydralazine prn for hypertension. Pt had a small, formed BM overnight. Passing gas. Voiding freely. IVF infusing. Ambulating x1 assist + RW. Going home with [facility] upon discharge, pending medical clearance. Patient calls appropriately for assistance. Appropriate safety measures maintained, call light within reach, and frequent rounding.      Problem: PAIN - ADULT  Goal: Verbalizes/displays adequate comfort level or patient's stated pain goal  Description: INTERVENTIONS:- Encourage pt to monitor pain and request assistance- Assess pain using appropriate pain scale- Administer analgesics based on type and severity of pain and evaluate response- Implement non-pharmacological measures as appropriate and evaluate response- Consider cultural and social influences on pain and pain management- Manage/alleviate anxiety- Utilize distraction and/or relaxation techniques- Monitor for opioid side effects- Notify MD/LIP if interventions unsuccessful or patient reports new pain- Anticipate increased pain with activity and pre-medicate as appropriate  Outcome: Progressing     Problem: SAFETY ADULT - FALL  Goal: Free from fall injury  Description: INTERVENTIONS:- Assess pt frequently for physical needs- Identify cognitive and physical deficits and behaviors that affect risk of falls.- [place] fall precautions as indicated by assessment.- Educate pt/family on patient safety including physical limitations- Instruct pt to call for assistance with activity based on assessment- Modify environment to reduce risk of injury- Provide assistive devices as appropriate- Consider OT/PT consult to assist with strengthening/mobility- Encourage toileting schedule  Outcome: Progressing     Problem: DISCHARGE PLANNING  Goal: Discharge to home or other facility with appropriate resources  Description:  INTERVENTIONS:- Identify barriers to discharge w/pt and caregiver- Include patient/family/discharge partner in discharge planning- Arrange for needed discharge resources and transportation as appropriate- Identify discharge learning needs (meds, wound care, etc)- Arrange for interpreters to assist at discharge as needed- Consider post-discharge preferences of patient/family/discharge partner- Complete POLST form as appropriate- Assess patient's ability to be responsible for managing their own health- Refer to Case Management Department for coordinating discharge planning if the patient needs post-hospital services based on physician/LIP order or complex needs related to functional status, cognitive ability or social support system  Outcome: Progressing     Problem: Altered Communication/Language Barrier  Goal: Patient/Family is able to understand and participate in their care  Description: Interventions:- Assess communication ability and preferred communication style- Implement communication aides and strategies- Use visual cues when possible- Listen attentively, be patient, do not interrupt- Minimize distractions- Allow time for understanding and response- Establish method for patient to ask for assistance (call light)- Provide an  as needed- Communicate barriers and strategies to overcome with those who interact with patient  Outcome: Progressing     Problem: GASTROINTESTINAL - ADULT  Goal: Minimal or absence of nausea and vomiting  Description: INTERVENTIONS:- Maintain adequate hydration with IV or PO as ordered and tolerated- Nasogastric tube to low intermittent suction as ordered- Evaluate effectiveness of ordered antiemetic medications- Provide nonpharmacologic comfort measures as appropriate- Advance diet as tolerated, if ordered- Obtain nutritional consult as needed- Evaluate fluid balance  Outcome: Progressing     Problem: Diabetes/Glucose Control  Goal: Glucose maintained within prescribed  range  Description: INTERVENTIONS:- Monitor Blood Glucose as ordered- Assess for signs and symptoms of hyperglycemia and hypoglycemia- Administer ordered medications to maintain glucose within target range- Assess barriers to adequate nutritional intake and initiate nutrition consult as needed- Instruct patient on self management of diabetes  Outcome: Progressing     Problem: Patient Centered Care  Goal: Patient preferences are identified and integrated in the patient's plan of care  Description: Interventions:- What would you like us to know as we care for you? - Provide timely, complete, and accurate information to patient/family- Incorporate patient and family knowledge, values, beliefs, and cultural backgrounds into the planning and delivery of care- Encourage patient/family to participate in care and decision-making at the level they choose- Honor patient and family perspectives and choices  Outcome: Progressing     Problem: Patient/Family Goals  Goal: Patient/Family Long Term Goal  Description: Patient's Long Term Goal: Interventions:- - See additional Care Plan goals for specific interventions  Outcome: Progressing  Goal: Patient/Family Short Term Goal  Description: Patient's Short Term Goal: Interventions: - - See additional Care Plan goals for specific interventions  Outcome: Progressing

## 2025-04-01 NOTE — CM/SW NOTE
04/01/25 1200   Discharge disposition   Expected discharge disposition Home-Health   Post Acute Care Provider [facility]   Discharge transportation Private car     MDO placed for discharge.     notified [facility] liaison Jess of patient discharge today.  [facility] will be reaching out to patient/family to coordinate start of care.    Patient cleared for discharge from CM/ standpoint.    / to remain available for support and/or discharge planning.     Lucía Webster RN, BSN  Nurse   [phone]

## 2025-04-01 NOTE — PLAN OF CARE
Problem: PAIN - ADULT  Goal: Verbalizes/displays adequate comfort level or patient's stated pain goal  Description: INTERVENTIONS:- Encourage pt to monitor pain and request assistance- Assess pain using appropriate pain scale- Administer analgesics based on type and severity of pain and evaluate response- Implement non-pharmacological measures as appropriate and evaluate response- Consider cultural and social influences on pain and pain management- Manage/alleviate anxiety- Utilize distraction and/or relaxation techniques- Monitor for opioid side effects- Notify MD/LIP if interventions unsuccessful or patient reports new pain- Anticipate increased pain with activity and pre-medicate as appropriate  Outcome: Progressing     Problem: SAFETY ADULT - FALL  Goal: Free from fall injury  Description: INTERVENTIONS:- Assess pt frequently for physical needs- Identify cognitive and physical deficits and behaviors that affect risk of falls.- [place] fall precautions as indicated by assessment.- Educate pt/family on patient safety including physical limitations- Instruct pt to call for assistance with activity based on assessment- Modify environment to reduce risk of injury- Provide assistive devices as appropriate- Consider OT/PT consult to assist with strengthening/mobility- Encourage toileting schedule  Outcome: Progressing     Problem: DISCHARGE PLANNING  Goal: Discharge to home or other facility with appropriate resources  Description: INTERVENTIONS:- Identify barriers to discharge w/pt and caregiver- Include patient/family/discharge partner in discharge planning- Arrange for needed discharge resources and transportation as appropriate- Identify discharge learning needs (meds, wound care, etc)- Arrange for interpreters to assist at discharge as needed- Consider post-discharge preferences of patient/family/discharge partner- Complete POLST form as appropriate- Assess patient's ability to be responsible for managing  their own health- Refer to Case Management Department for coordinating discharge planning if the patient needs post-hospital services based on physician/LIP order or complex needs related to functional status, cognitive ability or social support system  Outcome: Progressing     Problem: Altered Communication/Language Barrier  Goal: Patient/Family is able to understand and participate in their care  Description: Interventions:- Assess communication ability and preferred communication style- Implement communication aides and strategies- Use visual cues when possible- Listen attentively, be patient, do not interrupt- Minimize distractions- Allow time for understanding and response- Establish method for patient to ask for assistance (call light)- Provide an  as needed- Communicate barriers and strategies to overcome with those who interact with patient  Outcome: Progressing     Problem: GASTROINTESTINAL - ADULT  Goal: Minimal or absence of nausea and vomiting  Description: INTERVENTIONS:- Maintain adequate hydration with IV or PO as ordered and tolerated- Nasogastric tube to low intermittent suction as ordered- Evaluate effectiveness of ordered antiemetic medications- Provide nonpharmacologic comfort measures as appropriate- Advance diet as tolerated, if ordered- Obtain nutritional consult as needed- Evaluate fluid balance  Outcome: Progressing     Problem: Diabetes/Glucose Control  Goal: Glucose maintained within prescribed range  Description: INTERVENTIONS:- Monitor Blood Glucose as ordered- Assess for signs and symptoms of hyperglycemia and hypoglycemia- Administer ordered medications to maintain glucose within target range- Assess barriers to adequate nutritional intake and initiate nutrition consult as needed- Instruct patient on self management of diabetes  Outcome: Progressing     Problem: Patient Centered Care  Goal: Patient preferences are identified and integrated in the patient's plan of  care  Description: Interventions:- What would you like us to know as we care for you? - Provide timely, complete, and accurate information to patient/family- Incorporate patient and family knowledge, values, beliefs, and cultural backgrounds into the planning and delivery of care- Encourage patient/family to participate in care and decision-making at the level they choose- Honor patient and family perspectives and choices  Outcome: Progressing     No acute changes today. Diet advanced to low fiber soft, tolerating. No c/o pain or nausea. Monitoring sugars. Up sba/walker. Family at bedside. Plans to discharge home today with c. Call light within reach, frequent rounding.

## 2025-04-01 NOTE — PHYSICAL THERAPY NOTE
PHYSICAL THERAPY TREATMENT NOTE - INPATIENT     Room Number: 470/470-A       Presenting Problem: SBO  Co-Morbidities : Diabetes mellitus type 2, hypertension, hyperlipidemia, dementia, coronary artery disease status post LAD PCI stent, generalized osteoarthritis.    Problem List  Principal Problem:    SBO (small bowel obstruction) (HCC)  Active Problems:    Small bowel obstruction (HCC)    Gross hematuria    Bladder mass    Hyperglycemia      PHYSICAL THERAPY ASSESSMENT   Patient demonstrates excellent progress this session.      Patient has been evaluated and participated in 2 follow up PT treatment sessions and presents with no skilled IP Physical Therapy needs at this time.  Patient will be discharged from Physical Therapy services. Please re-order if a new functional limitation presents during this admission.    Plan: HHPT and family support as pt near functional baseline    PLAN DURING HOSPITALIZATION  Nursing Mobility Recommendation : 1 Assist  PT Device Recommendation: Rolling walker, Gait belt  PT Treatment Plan: Bed mobility, Body mechanics, Endurance, Energy conservation, Patient education, Family education, Gait training, Strengthening, Stair training, Transfer training, Balance training  Frequency (Obs): 5x/week     SUBJECTIVE  \"I keep going\"     OBJECTIVE  Precautions: Bed/chair alarm,  needed    PAIN ASSESSMENT   Ratin  Location: abdomen  Management Techniques: Activity promotion, Body mechanics    BALANCE  Static Sitting: Normal  Dynamic Sitting: Good  Static Standing: Fair +  Dynamic Standing: Fair    ACTIVITY TOLERANCE  Pulse: 72  Heart Rate Source: Monitor                    O2 WALK  Oxygen Therapy  SPO2% Ambulation on Room Air: 97    AM-PAC '6-Clicks' INPATIENT SHORT FORM - BASIC MOBILITY  How much difficulty does the patient currently have...  Patient Difficulty: Turning over in bed (including adjusting bedclothes, sheets and blankets)?: None   Patient Difficulty: Sitting down on  and standing up from a chair with arms (e.g., wheelchair, bedside commode, etc.): A Little   Patient Difficulty: Moving from lying on back to sitting on the side of the bed?: None   How much help from another person does the patient currently need...   Help from Another: Moving to and from a bed to a chair (including a wheelchair)?: A Little   Help from Another: Need to walk in hospital room?: A Little   Help from Another: Climbing 3-5 steps with a railing?: A Little     AM-PAC Score:  Raw Score: 20   Approx Degree of Impairment: 35.83%   Standardized Score (AM-PAC Scale): 47.67   CMS Modifier (G-Code): CJ    FUNCTIONAL ABILITY STATUS  Functional Mobility/Gait Assessment  Gait Assistance:  (SBA)  Distance (ft): 400ft  Assistive Device: Rolling walker  Pattern:  (decreased rachel speed, forward flexed posture- cues to correct)  Stairs:  (NT 4/1)  How Many Stairs: 12  Device: 1 Rail  Assist: Contact guard assist  Pattern: Ascend and Descend  Ascend and Descend : Step to  Sit to Stand: supervision with use of RW. Cues for pacing upon standing to allow body time to acclimate to change in position.     Skilled Therapy Provided: Patient received sitting in bedside chair with dtr present. RN approved activity. Dtr assisting with [place] translation. Therapist educated pt on POC and physiological benefits of mobilization. Cues for pacing and energy conservation techniques that can be applied to home environment. Patient agreeable to participate. Denies pain. Vitals stable throughout activity.     The patient's Approx Degree of Impairment: 35.83% has been calculated based on documentation in the [facility] '6 clicks' Inpatient Daily Activity Short Form.  Research supports that patients with this level of impairment may benefit from home w home PT and family support..  Final disposition will be made by interdisciplinary medical team.      Patient End of Session: Up in chair, Needs met, RN aware of session/findings, Call light  within reach, Family present    CURRENT GOALS   Goals to be met by: 25  Patient Goal Patient's self-stated goal is: return home with HH   Goal #1 Patient is able to demonstrate supine - sit EOB @ level: modified independent     Goal #1   Current Status progressing   Goal #2 Patient is able to demonstrate transfers Sit to/from Stand at assistance level: modified independent with walker - rolling     Goal #2  Current Status progressing   Goal #3 Patient is able to ambulate 200 feet with assist device: LRAD at assistance level: modified independent   Goal #3   Current Status progressing   Goal #4 Patient will negotiate 6 stairs/one curb w/ assistive device and supervision   Goal #4   Current Status progressing   Goal #5 Patient to demonstrate independence with home activity/exercise instructions provided to patient in preparation for discharge.   Goal #5   Current Status ongoing     Gait Trainin minutes  Therapeutic Activity: 11 minutes

## 2025-04-01 NOTE — DISCHARGE SUMMARY
[facility]  part of [facility]     Discharge Summary    Uli Fountain Patient Status:  Inpatient    5/3/1940 MRN [id number]   Location [facility] 4W/SW/SE Attending Dillan Barros MD   Hosp Day # 4 PCP Angela Ryan MD     Date of Admission: 3/28/2025    Date of Discharge: 2025.    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 2022    COVID-19    Confusion    Small bowel obstruction (HCC)    SBO (small bowel obstruction) (HCC)    Gross hematuria    Bladder mass    Hyperglycemia       Reason for Admission: Small Bowel Obstruction    Physical Exam:     General: Patient is alert and oriented x3 does not appear to be in acute distress at this time  HEENT: EOMI PERRLA, atraumatic normocephalic  Cardiac: S1-S2 appreciated  Lungs: Good air entry bilaterally clear to auscultation  Abdomen: Soft nontender nondistended positive bowel sounds  Ext: Peripheral pulses are positive  Neuro: No focal deficits noted  Psych: Normal mood  Skin: No rashes noted  MSK: Full range of motion intact      Hospital Course:     SBO (small bowel obstruction) (HCC)  - NGT placed   - no acute surgical intervention indicated.   - appreciate surgical recs   - NGT today.   - plan to repeat imaging tomorrow.   - Continue IVF, IV analgesics, IV antiemetics PRN  - will monitor closely.      Gross hematuria  -found to have a bladder mass  -Urology placed on consult  -appreciate recs  -will continue to monitor closely  -will require cysto but pending SBO .     DM type II  -Hyperglycemia  -continue accuchecks    -titrate meds as needed  -supplement with sliding scale.      Accelerated HTN  -will start on IV hydralazine 10mg Q6 prn SBP > 165  -monitor and titrate      CAD  -currently stable     VTE ppx: holding in the setting of hematuria.     Global A/P  -hypomagnesemia - replete  -advanced diet today    History of Present Illness: Per admitting:     The patient is an 84-year-old  male who presented to the emergency department for evaluation of abdominal distention, nausea, vomiting since last night. CBC showed white blood cell count of 12.4 with left shift. Chemistry showed GFR 58, chloride 94, potassium 4.5, glucose 323. CT scan of the abdomen and pelvis showed small bowel obstruction with focal transition point involving the left posterior paramedian abdomen. No acute appendicitis or diverticulitis. No pneumoperitoneum. There is 1.5 cm mass arising from the left lateral dome which is concerning for urothelial carcinoma. The patient has an NG tube placed and started on IV fluids, pain medications. He will be admitted to the hospital for further management.     Disposition: Home or Self Care    Discharge Condition: Stable    Discharge Medications:   Current Discharge Medication List        CONTINUE these medications which have NOT CHANGED    Details   amLODIPine 5 MG Oral Tab Take 1 tablet (5 mg total) by mouth daily.      Brexpiprazole 0.5 MG Oral Tab Take 1 tablet by mouth daily.      tamsulosin 0.4 MG Oral Cap Take 1 capsule (0.4 mg total) by mouth daily.      Cholecalciferol (VITAMIN D-3 OR) Take 1 capsule by mouth daily.      Multiple Vitamin (MULTI-VITAMIN) Oral Tab Take 1 tablet by mouth daily.      Cyanocobalamin (B-12 OR) Take 1 tablet by mouth daily.      aspirin (ASPIRIN 81) 81 MG Oral Tab EC Take 1 tablet (81 mg total) by mouth 2 (two) times daily.      pioglitazone 15 MG Oral Tab Take 1 tablet (15 mg total) by mouth daily.      metFORMIN HCl 1000 MG Oral Tab Take 1 tablet (1,000 mg total) by mouth 2  (two) times daily with meals.      glimepiride 4 MG Oral Tab Take 1 tablet (4 mg total) by mouth 2 (two) times daily with meals.      losartan 100 MG Oral Tab Take 1 tablet (100 mg total) by mouth daily.      Glucosamine HCl (GLUCOSAMINE OR) Take 1 tablet by mouth daily.      atorvastatin 80 MG Oral Tab Take 1 tablet (80 mg total) by mouth nightly.             Total dc time > 30 min    Dillan Barros MD  4/1/2025  11:28 AM     Hospital Discharge Diagnoses:  SBO    Lace+ Score: 48  59-90 High Risk  29-58 Medium Risk  0-28   Low Risk.    TCM Follow-Up Recommendation:  LACE > 58: High Risk of readmission after discharge from the hospital.

## 2025-04-01 NOTE — HOME CARE LIAISON
Met with patient and dtr Britt at the bedside. Patient is agreeable to [facility]. Residential brochure provided with contact information. All questions addressed and answered.

## 2025-04-25 NOTE — DISCHARGE INSTRUCTIONS
HOME INSTRUCTIONS  [facility] HOME CARE INSTRUCTIONS: POST-OP ANESTHESIA  The medication that you received for sedation or general anesthesia can last up to 24 hours. Your judgment and reflexes may be altered, even if you feel like your normal self.      We Recommend:   Do not drive any motor vehicle or bicycle   Avoid mowing the lawn, playing sports, or working with power tools/applicances (power saws, electric knives or mixers)   That you have someone stay with you on your first night home   Do not drink alcohol or take sleeping pills or tranquilizers   Do not sign legal documents within 24 hours of your procedure   If you had a nerve block for your surgery, take extra care not to put any pressure on your arm or hand for 24 hours    It is normal:  For you to have a sore throat if you had a breathing tube during surgery (while you were asleep!). The sore throat should get better within 48 hours. You can gargle with warm salt water (1/2 tsp in 4 oz warm water) or use a throat lozenge for comfort  To feel muscle aches or soreness especially in the abdomen, chest or neck. The achy feeling should go away in the next 24 hours  To feel weak, sleepy or \"wiped out\". Your should start feeling better in the next 24 hours.   To experience mild discomforts such as sore lip or tongue, headache, cramps, gas pains or a bloated feeling in your abdomen.   To experience mild back pain or soreness for a day or two if you had spinal or epidural anesthesia.   If you had laparoscopic surgery, to feel shoulder pain or discomfort on the day of surgery.   For some patients to have nausea after surgery/anesthesia    If you feel nausea or experience vomiting:   Try to move around less.   Eat less than usual or drink only liquids until the next morning   Nausea should resolve in about 24 hours    If you have a problem when you are at home:    Call your surgeons office   Discharge Instructions: After Your Surgery  You’ve just had surgery. During  surgery, you were given medicine called anesthesia to keep you relaxed and free of pain. After surgery, you may have some pain or nausea. This is common. Here are some tips for feeling better and getting well after surgery.   Going home  Your healthcare provider will show you how to take care of yourself when you go home. They'll also answer your questions. Have an adult family member or friend drive you home. For the first 24 hours after your surgery:   Don't drive or use heavy equipment.  Don't make important decisions or sign legal papers.  Take medicines as directed.  Don't drink alcohol.  Have someone stay with you, if needed. They can watch for problems and help keep you safe.  Be sure to go to all follow-up visits with your healthcare provider. And rest after your surgery for as long as your provider tells you to.   Coping with pain  If you have pain after surgery, pain medicine will help you feel better. Take it as directed, before pain becomes severe. Also, ask your healthcare provider or pharmacist about other ways to control pain. This might be with heat, ice, or relaxation. And follow any other instructions your surgeon or nurse gives you.      Stay on schedule with your medicine.     Tips for taking pain medicine  To get the best relief possible, remember these points:   Pain medicines can upset your stomach. Taking them with a little food may help.  Most pain relievers taken by mouth need at least 20 to 30 minutes to start to work.  Don't wait till your pain becomes severe before you take your medicine. Try to time your medicine so that you can take it before starting an activity. This might be before you get dressed, go for a walk, or sit down for dinner.  Constipation is a common side effect of some pain medicines. Call your healthcare provider before taking any medicines, such as laxatives or stool softeners, to help ease constipation. Also ask if you should skip any foods. Drinking lots of fluids  and eating foods, such as fruits and vegetables, that are high in fiber can also help. Remember, don't take laxatives unless your surgeon has prescribed them.  Drinking alcohol and taking pain medicine can cause dizziness and slow your breathing. It can even be deadly. Don't drink alcohol while taking pain medicine.  Pain medicine can make you react more slowly to things. Don't drive or run machinery while taking pain medicine.  Your healthcare provider may tell you to take acetaminophen to help ease your pain. Ask them how much you're supposed to take each day. Acetaminophen or other pain relievers may interact with your prescription medicines or other over-the-counter (OTC) medicines. Some prescription medicines have acetaminophen and other ingredients in them. Using both prescription and OTC acetaminophen for pain can cause you to accidentally overdose. Read the labels on your OTC medicines with care. This will help you to clearly know the list of ingredients, how much to take, and any warnings. It may also help you not take too much acetaminophen. If you have questions or don't understand the information, ask your pharmacist or healthcare provider to explain it to you before you take the OTC medicine.   Managing nausea  Some people have an upset stomach (nausea) after surgery. This is often because of anesthesia, pain, or pain medicine, less movement of food in the stomach, or the stress of surgery. These tips will help you handle nausea and eat healthy foods as you get better. If you were on a special food plan before surgery, ask your healthcare provider if you should follow it while you get better. Check with your provider on how your eating should progress. It may depend on the surgery you had. These general tips may help:   Don't push yourself to eat. Your body will tell you when to eat and how much.  Start off with clear liquids and soup. They're easier to digest.  Next try semi-solid foods as you feel  ready. These include mashed potatoes, applesauce, and gelatin.  Slowly move to solid foods. Don’t eat fatty, rich, or spicy foods at first.  Don't force yourself to have 3 large meals a day. Instead eat smaller amounts more often.  Take pain medicines with a small amount of solid food, such as crackers or toast. This helps prevent nausea.  When to call your healthcare provider  Call your healthcare provider right away if you have any of these:   You still have too much pain, or the pain gets worse, after taking the medicine. The medicine may not be strong enough. Or there may be a complication from the surgery.  You feel too sleepy, dizzy, or groggy. The medicine may be too strong.  Side effects, such as nausea or vomiting. Your healthcare provider may advise taking other medicines to treat these or may change your treatment plan..  Skin changes, such as rash, itching, or hives. This may mean you have an allergic reaction. Your provider may advise taking other medicines.  The incision looks different (for instance, part of it opens up).  Bleeding or fluid leaking from the incision site, and you weren't told to expect that.  Fever of 100.4°F (38°C) or higher, or as directed by your healthcare provider.  Call 911  Call 911 right away if you have:   Trouble breathing  Facial swelling    If you have obstructive sleep apnea   You were given anesthesia medicine during surgery to keep you comfortable and free of pain. After surgery, you may have more apnea spells because of this medicine and other medicines you were given. The spells may last longer than normal.    At home:  Keep using the continuous positive airway pressure (CPAP) device when you sleep. Unless your healthcare provider tells you not to, use it when you sleep, day or night. CPAP is a common device used to treat obstructive sleep apnea.  Talk with your provider before taking any pain medicine, muscle relaxants, or sedatives. Your provider will tell you about  the possible dangers of taking these medicines.  Contact your provider if your sleeping changes a lot even when taking medicines as directed.  [employer] last reviewed this educational content on 4/1/2024  This information is for informational purposes only. This is not intended to be a substitute for professional medical advice, diagnosis, or treatment. Always seek the advice and follow the directions from your physician or other qualified health care provider.  © 5418-9919 [employer]. All rights reserved. This information is not intended as a substitute for professional medical care. Always follow your healthcare professional's instructions.

## 2025-04-26 RX ORDER — CEPHALEXIN 500 MG/1
500 CAPSULE ORAL 2 TIMES DAILY
COMMUNITY

## 2025-04-28 ENCOUNTER — HOSPITAL ENCOUNTER (OUTPATIENT)
Facility: HOSPITAL | Age: 85
Setting detail: HOSPITAL OUTPATIENT SURGERY
Discharge: HOME | End: 2025-04-28
Attending: UROLOGY | Admitting: UROLOGY
Payer: MEDICARE

## 2025-04-28 ENCOUNTER — ANESTHESIA (OUTPATIENT)
Dept: SURGERY | Facility: HOSPITAL | Age: 85
End: 2025-04-28
Payer: MEDICARE

## 2025-04-28 ENCOUNTER — ANESTHESIA EVENT (OUTPATIENT)
Dept: SURGERY | Facility: HOSPITAL | Age: 85
End: 2025-04-28
Payer: MEDICARE

## 2025-04-28 VITALS
HEART RATE: 60 BPM | SYSTOLIC BLOOD PRESSURE: 168 MMHG | WEIGHT: 174 LBS | TEMPERATURE: 97 F | RESPIRATION RATE: 15 BRPM | HEIGHT: 70 IN | BODY MASS INDEX: 24.91 KG/M2 | OXYGEN SATURATION: 98 % | DIASTOLIC BLOOD PRESSURE: 62 MMHG

## 2025-04-28 DIAGNOSIS — N32.89 BLADDER MASS: Primary | ICD-10-CM

## 2025-04-28 LAB
GLUCOSE BLDC GLUCOMTR-MCNC: 129 MG/DL (ref 70–99)
GLUCOSE BLDC GLUCOMTR-MCNC: 160 MG/DL (ref 70–99)

## 2025-04-28 PROCEDURE — 82962 GLUCOSE BLOOD TEST: CPT

## 2025-04-28 PROCEDURE — 88307 TISSUE EXAM BY PATHOLOGIST: CPT | Performed by: UROLOGY

## 2025-04-28 RX ORDER — NALOXONE HYDROCHLORIDE 0.4 MG/ML
0.08 INJECTION, SOLUTION INTRAMUSCULAR; INTRAVENOUS; SUBCUTANEOUS AS NEEDED
Status: DISCONTINUED | OUTPATIENT
Start: 2025-04-28 | End: 2025-04-28

## 2025-04-28 RX ORDER — MORPHINE SULFATE 4 MG/ML
2 INJECTION, SOLUTION INTRAMUSCULAR; INTRAVENOUS EVERY 10 MIN PRN
Status: DISCONTINUED | OUTPATIENT
Start: 2025-04-28 | End: 2025-04-28

## 2025-04-28 RX ORDER — HYDROCODONE BITARTRATE AND ACETAMINOPHEN 5; 325 MG/1; MG/1
1 TABLET ORAL EVERY 6 HOURS PRN
Qty: 12 TABLET | Refills: 0 | Status: SHIPPED | OUTPATIENT
Start: 2025-04-28

## 2025-04-28 RX ORDER — MORPHINE SULFATE 10 MG/ML
6 INJECTION, SOLUTION INTRAMUSCULAR; INTRAVENOUS EVERY 10 MIN PRN
Status: DISCONTINUED | OUTPATIENT
Start: 2025-04-28 | End: 2025-04-28

## 2025-04-28 RX ORDER — HYDROMORPHONE HYDROCHLORIDE 1 MG/ML
0.4 INJECTION, SOLUTION INTRAMUSCULAR; INTRAVENOUS; SUBCUTANEOUS EVERY 5 MIN PRN
Status: DISCONTINUED | OUTPATIENT
Start: 2025-04-28 | End: 2025-04-28

## 2025-04-28 RX ORDER — ACETAMINOPHEN 500 MG
1000 TABLET ORAL ONCE
Status: COMPLETED | OUTPATIENT
Start: 2025-04-28 | End: 2025-04-28

## 2025-04-28 RX ORDER — NICOTINE POLACRILEX 4 MG
15 LOZENGE BUCCAL
Status: DISCONTINUED | OUTPATIENT
Start: 2025-04-28 | End: 2025-04-28

## 2025-04-28 RX ORDER — MORPHINE SULFATE 4 MG/ML
4 INJECTION, SOLUTION INTRAMUSCULAR; INTRAVENOUS EVERY 10 MIN PRN
Status: DISCONTINUED | OUTPATIENT
Start: 2025-04-28 | End: 2025-04-28

## 2025-04-28 RX ORDER — SODIUM CHLORIDE, SODIUM LACTATE, POTASSIUM CHLORIDE, CALCIUM CHLORIDE 600; 310; 30; 20 MG/100ML; MG/100ML; MG/100ML; MG/100ML
INJECTION, SOLUTION INTRAVENOUS CONTINUOUS
Status: DISCONTINUED | OUTPATIENT
Start: 2025-04-28 | End: 2025-04-28

## 2025-04-28 RX ORDER — LIDOCAINE HYDROCHLORIDE 10 MG/ML
INJECTION, SOLUTION EPIDURAL; INFILTRATION; INTRACAUDAL; PERINEURAL AS NEEDED
Status: DISCONTINUED | OUTPATIENT
Start: 2025-04-28 | End: 2025-04-28 | Stop reason: SURG

## 2025-04-28 RX ORDER — HYOSCYAMINE SULFATE 0.125 MG
0.12 TABLET,DISINTEGRATING ORAL EVERY 4 HOURS PRN
Status: DISCONTINUED | OUTPATIENT
Start: 2025-04-28 | End: 2025-04-28

## 2025-04-28 RX ORDER — NICOTINE POLACRILEX 4 MG
30 LOZENGE BUCCAL
Status: DISCONTINUED | OUTPATIENT
Start: 2025-04-28 | End: 2025-04-28

## 2025-04-28 RX ORDER — DEXTROSE MONOHYDRATE 25 G/50ML
50 INJECTION, SOLUTION INTRAVENOUS
Status: DISCONTINUED | OUTPATIENT
Start: 2025-04-28 | End: 2025-04-28

## 2025-04-28 RX ORDER — HYDROMORPHONE HYDROCHLORIDE 1 MG/ML
0.2 INJECTION, SOLUTION INTRAMUSCULAR; INTRAVENOUS; SUBCUTANEOUS EVERY 5 MIN PRN
Status: DISCONTINUED | OUTPATIENT
Start: 2025-04-28 | End: 2025-04-28

## 2025-04-28 RX ORDER — HYDROMORPHONE HYDROCHLORIDE 1 MG/ML
0.6 INJECTION, SOLUTION INTRAMUSCULAR; INTRAVENOUS; SUBCUTANEOUS EVERY 5 MIN PRN
Status: DISCONTINUED | OUTPATIENT
Start: 2025-04-28 | End: 2025-04-28

## 2025-04-28 RX ORDER — ROCURONIUM BROMIDE 10 MG/ML
INJECTION, SOLUTION INTRAVENOUS AS NEEDED
Status: DISCONTINUED | OUTPATIENT
Start: 2025-04-28 | End: 2025-04-28 | Stop reason: SURG

## 2025-04-28 RX ORDER — DEXAMETHASONE SODIUM PHOSPHATE 4 MG/ML
VIAL (ML) INJECTION AS NEEDED
Status: DISCONTINUED | OUTPATIENT
Start: 2025-04-28 | End: 2025-04-28 | Stop reason: SURG

## 2025-04-28 RX ORDER — ONDANSETRON 2 MG/ML
INJECTION INTRAMUSCULAR; INTRAVENOUS AS NEEDED
Status: DISCONTINUED | OUTPATIENT
Start: 2025-04-28 | End: 2025-04-28 | Stop reason: SURG

## 2025-04-28 RX ORDER — LABETALOL HYDROCHLORIDE 5 MG/ML
5 INJECTION, SOLUTION INTRAVENOUS AS NEEDED
Status: DISCONTINUED | OUTPATIENT
Start: 2025-04-28 | End: 2025-04-28

## 2025-04-28 RX ADMIN — ONDANSETRON 4 MG: 2 INJECTION INTRAMUSCULAR; INTRAVENOUS at 11:17:00

## 2025-04-28 RX ADMIN — LIDOCAINE HYDROCHLORIDE 50 MG: 10 INJECTION, SOLUTION EPIDURAL; INFILTRATION; INTRACAUDAL; PERINEURAL at 10:44:00

## 2025-04-28 RX ADMIN — DEXAMETHASONE SODIUM PHOSPHATE 4 MG: 4 MG/ML VIAL (ML) INJECTION at 10:53:00

## 2025-04-28 RX ADMIN — ROCURONIUM BROMIDE 40 MG: 10 INJECTION, SOLUTION INTRAVENOUS at 10:44:00

## 2025-04-28 RX ADMIN — SODIUM CHLORIDE, SODIUM LACTATE, POTASSIUM CHLORIDE, CALCIUM CHLORIDE: 600; 310; 30; 20 INJECTION, SOLUTION INTRAVENOUS at 11:31:00

## 2025-04-28 RX ADMIN — ROCURONIUM BROMIDE 10 MG: 10 INJECTION, SOLUTION INTRAVENOUS at 10:54:00

## 2025-04-28 RX ADMIN — SODIUM CHLORIDE, SODIUM LACTATE, POTASSIUM CHLORIDE, CALCIUM CHLORIDE: 600; 310; 30; 20 INJECTION, SOLUTION INTRAVENOUS at 10:33:00

## 2025-04-28 NOTE — OPERATIVE REPORT
[facility]     PATIENT'S NAME: Uli Fountain   ATTENDING PHYSICIAN: Mariela Singh MD   OPERATING PHYSICIAN: Mariela Singh MD     MRN: [id number]  : 5/3/1940  DATE OF OPERATION: 2025    OPERATIVE REPORT        PREOPERATIVE DIAGNOSIS:  Gross hematuria, bladder tumor    POSTOPERATIVE DIAGNOSIS:  Same    PROCEDURE PERFORMED:  Cystoscopy, bipolar transurethral resection of bladder tumor, instillation of intravesical gemcitabine 2000 mg     ANESTHESIA:  General     ESTIMATED BLOOD LOSS:  Minimal     INTRAVENOUS FLUIDS:  See anesthesia record.      URINE OUTPUT:  Not measured     COMPLICATIONS:  None     DRAINS:  18 Setswana danielle catheter     SPECIMENS:  Posterior wall bladder tumor and erythema     DISPOSITION:  Stable to recovery room.     INDICATIONS:  The patient is a 84 year old-year-old male who had noticed some discolored urine and had a recent CT for other reasons which revealed a possible bladder tumor.  He is brought to the OR today for cystoscopy and transurethral resection of tumor.  We discussed risks including bleeding, infection, damage to nearby structures, bladder perforation, need for danielle catheter, need for additional procedures or treatments, and others.  All questions were answered and patient and his daughter agreed to proceed.     FINDINGS:  Normal anterior urethra.  Obstructive prostate with elevated bladder neck.  Mild bladder trabeculation.  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  Bilateral ureteral orifices were seen effluxing clear yellow urine.       PROCEDURE:  Informed consent was obtained. The patient was brought to the operative suite where general anesthesia was administered.  IV antibiotics were given and SCDs were applied.  The patient was placed in the dorsal lithotomy position, prepped, and draped in the usual fashion.  After timeout was completed, the bipolar resectoscope was inserted  per urethra and advanced into the bladder under direct vision.  Bladder was thoroughly inspected with findings as above.  The papillary tumors were then resected completely.  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  Total resection area was about 4 cm.  The base of resection and surrounding mucosa were thoroughly fulgurated and hemostasis was confirmed with the bladder full and empty.  There were no other lesions seen.  The scope was then removed and an 18 Moldovan danielle catheter was advanced into the bladder with return of clear fluid.  The balloon was inflated with 10 mL.  After the bladder drained I instilled 2g gemcitabine through the danielle into the bladder and catheter was plugged.  The patient was returned to the supine position, awakened, and taken to the recovery room in stable condition having tolerated the procedure well.      PLAN:  Chemotherapy to dwell within the bladder x 1 hour then drain.  Patient will be discharged with danielle catheter.  Decath in the office tomorrow.

## 2025-04-28 NOTE — ANESTHESIA PREPROCEDURE EVALUATION
Anesthesia PreOp Note    HPI:     Uli Fountain is a 84 year old male who presents for preoperative consultation requested by: Mariela Singh MD    Date of Surgery: 4/28/2025    Procedure(s):  Cystoscopy, bipolar transurethral resection of bladder tumor, instillation of gemcitabine 2000mg  Indication: Bladder tumor    Relevant Problems   No relevant active problems       NPO:  Last Liquid Consumption Date: 04/27/25  Last Liquid Consumption Time: 2200  Last Solid Consumption Date: 04/27/25  Last Solid Consumption Time: 1800  Last Liquid Consumption Date: 04/27/25          History Review:  Patient Active Problem List    Diagnosis Date Noted    Small bowel obstruction (HCC) 03/28/2025    SBO (small bowel obstruction) (HCC) 03/28/2025    Gross hematuria 03/28/2025    Bladder mass 03/28/2025    Hyperglycemia 03/28/2025    COVID-19 12/17/2022    Confusion 12/17/2022    Status post total knee replacement, 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 1/19/2022 10/29/2021    Preoperative evaluation to rule out surgical contraindication 08/02/2021    Angina of effort 05/19/2020    Coronary artery disease of native artery of native heart with stable angina pectoris 05/19/2020       Past Medical History[1]    Past Surgical History[2]    Prescriptions Prior to Admission[3]  Current Medications and Prescriptions Ordered in Epic[4]    Allergies[5]    Family History[6]  Social Hx on file[7]    Available pre-op labs reviewed.  Lab Results   Component Value Date    WBC 4.9 04/01/2025    RBC 3.09 (L) 04/01/2025    HGB 9.4 (L) 04/01/2025    HCT 28.8 (L) 04/01/2025    MCV 93.2 04/01/2025    MCH 30.4 04/01/2025    MCHC 32.6 04/01/2025    RDW 14.5 04/01/2025    .0 04/01/2025     Lab Results   Component Value Date     03/31/2025    K 3.6 03/31/2025     03/31/2025    CO2 28.0 03/31/2025    BUN 21 03/31/2025    CREATSERUM 1.02 03/31/2025     (H) 03/31/2025    PGLU 166 (H) 04/01/2025    CA 7.7 (L) 03/31/2025          Vital Signs:  Body  mass index is 24.97 kg/m².   height is 1.778 m (5' 10\") and weight is 78.9 kg (174 lb). His oral temperature is 98.1 °F (36.7 °C). His blood pressure is 168/60 (abnormal) and his pulse is 56. His respiration is 14 and oxygen saturation is 98%.   Vitals:    04/25/25 1224 04/28/25 0919   BP:  (!) 168/60   Pulse:  56   Resp:  14   Temp:  98.1 °F (36.7 °C)   TempSrc:  Oral   SpO2:  98%   Weight: 82.1 kg (181 lb) 78.9 kg (174 lb)   Height: 1.778 m (5' 10\")         Anesthesia Evaluation     Patient summary reviewed and Nursing notes reviewed    No history of anesthetic complications   Airway   Mallampati: II  TM distance: >3 FB  Neck ROM: full  Dental    (+) upper dentures and lower dentures    Pulmonary - negative ROS and normal exam   Cardiovascular - normal exam  (+) hypertension, CAD    ROS comment: EKG SR 1st degree AVB  TTE 23 normal EF  Stress 23 normal    Neuro/Psych - negative ROS     GI/Hepatic/Renal - negative ROS     Endo/Other    (+) diabetes mellitus  Abdominal                  Anesthesia Plan:   ASA:  2  Plan:   General  Airway:  ETT and LMA  Post-op Pain Management: IV analgesics  Plan Comments: ETT vs LMA will ask surgeon in they need muscle relaxation for TURBT  Informed Consent Plan and Risks Discussed With:  Patient and child/children  Discussed plan with:  CRNA      I have informed Uli Fountain and/or legal guardian or family member of the nature of the anesthetic plan, benefits, risks including possible dental damage if relevant, major complications, and any alternative forms of anesthetic management.   All of the patient's questions were answered to the best of my ability. The patient desires the anesthetic management as planned.  Brian Navarrete MD  4/28/2025 9:33 AM  Present on Admission:  **None**           [1]   Past Medical History:   Bipolar affective (HCC)    Cancer (HCC)    Coronary atherosclerosis    Diabetes (HCC)    Hearing impaired person, bilateral    High blood pressure     High cholesterol    Osteoarthritis    Senile paranoid dementia (HCC)   [2]   Past Surgical History:  Procedure Laterality Date    Cholecystectomy      Colectomy      HAD COLOSTOMY REVERSED   [3]   Medications Prior to Admission   Medication Sig Dispense Refill Last Dose/Taking    cephALEXin 500 MG Oral Cap Take 1 capsule (500 mg total) by mouth 2 (two) times daily.   4/27/2025    amLODIPine 5 MG Oral Tab Take 1 tablet (5 mg total) by mouth in the morning.   4/28/2025 Morning    Brexpiprazole 0.5 MG Oral Tab Take 1 tablet by mouth in the morning.   4/27/2025    tamsulosin 0.4 MG Oral Cap Take 1 capsule (0.4 mg total) by mouth in the morning.   4/27/2025    Cholecalciferol (VITAMIN D-3 OR) Take 1 capsule by mouth in the morning. PT'S DAUGHTER WILL HAVE PT START HOLDING MEDICATION STARTING 4/26/2025. PT TOOK DOSE TODAY.   4/26/2025    Multiple Vitamin (MULTI-VITAMIN) Oral Tab Take 1 tablet by mouth in the morning. PT'S DAUGHTER WILL HAVE PT HOLD MEDICATION STARTING 4/26/2025. PT TOOK DOSE TODAY.   4/26/2025    Cyanocobalamin (B-12 OR) Take 1 tablet by mouth in the morning. PT'S DAUGHTER WILL PT HAS STOP MEDICATION STARTING 4/26/2025 PT TOOK LAST DOSE TODAY.   Past Week    aspirin (ASPIRIN 81) 81 MG Oral Tab EC Take 1 tablet (81 mg total) by mouth in the morning and 1 tablet (81 mg total) before bedtime. PT'S DAUGHTER WILL CALL  4/25/2025 TO FIND OUT WHEN TO STOP MEDICATION.   4/24/2025    pioglitazone 15 MG Oral Tab Take 1 tablet (15 mg total) by mouth in the morning.   4/27/2025    metFORMIN HCl 1000 MG Oral Tab Take 1 tablet (1,000 mg total) by mouth in the morning and 1 tablet (1,000 mg total) in the evening. Take with meals. PT'S DAUGHTER WILL HOLD MEDICATION DAY OF SURGERY.   4/27/2025    glimepiride 4 MG Oral Tab Take 1 tablet (4 mg total) by mouth in the morning and 1 tablet (4 mg total) in the evening. Take with meals. PT'S DAUGHTER WILL HAVE PT HOLD MEDICATION DAY OF SURGERY.   4/27/2025    losartan 100 MG  Oral Tab Take 1 tablet (100 mg total) by mouth in the morning. PT'S DAUGHTER WILL HAVE PT HOLD MEDICATION DAY OF SURGERY.   4/27/2025    Glucosamine HCl (GLUCOSAMINE OR) Take 1 tablet by mouth in the morning. PT'S DAUGHTER WILL  HAVE PT STOP MEDICATION STARTING 4/26/2025, PT TOOK DOSE TODAY.   Past Week    atorvastatin 80 MG Oral Tab Take 1 tablet (80 mg total) by mouth nightly.   4/27/2025   [4]   Current Facility-Administered Medications Ordered in Epic   Medication Dose Route Frequency Provider Last Rate Last Admin    lactated ringers infusion   Intravenous Continuous Mariela Singh MD        acetaminophen (Tylenol Extra Strength) tab 1,000 mg  1,000 mg Oral Once Mariela Singh MD        gemcitabine (Gemzar) 2 g/100mL in sodium chloride 0.9% bladder instillation  2 g Bladder Instillation Once Mariela Singh MD        ceFAZolin (Ancef) 2g in 10mL IV syringe premix  2 g Intravenous On Call to OR Mariela Singh MD         No current [facility]-ordered outpatient medications on file.   [5] No Known Allergies  [6] History reviewed. No pertinent family history.  [7]   Social History  Socioeconomic History    Marital status:    Tobacco Use    Smoking status: Never    Smokeless tobacco: Never   Vaping Use    Vaping status: Never Used   Substance and Sexual Activity    Alcohol use: Not Currently    Drug use: Never

## 2025-04-28 NOTE — ANESTHESIA POSTPROCEDURE EVALUATION
Patient: Uli Fountain    Procedure Summary       Date: 04/28/25 Room / Location: [facility] MAIN OR  / [facility] MAIN OR    Anesthesia Start: 1033 Anesthesia Stop: 1143    Procedure: Cystoscopy, bipolar transurethral resection of bladder tumor, instillation of gemcitabine 2000mg Diagnosis: (Bladder tumor)    Surgeons: Mariela Singh MD Anesthesiologist: Brian Navarrete MD    Anesthesia Type: general ASA Status: 2            Anesthesia Type: general    Vitals Value Taken Time   /72 04/28/25 11:42   Temp 97.4 °F (36.3 °C) 04/28/25 11:42   Pulse 61 04/28/25 11:43   Resp 16 04/28/25 11:43   SpO2 100 % 04/28/25 11:43   Vitals shown include unfiled device data.    [facility] AN Post Evaluation:   Patient Evaluated in PACU  Patient Participation: complete - patient participated  Level of Consciousness: awake  Pain Score: 0  Pain Management: adequate  Airway Patency:patent  Dental exam unchanged from preop  Yes    Nausea/Vomiting: none  Respiratory Status: nasal cannula  Postoperative Hydration stable      Tg Marlow CRNA  4/28/2025 11:43 AM

## 2025-04-28 NOTE — H&P
[facility] ADULT HISTORY AND PHYSICAL      IDENTIFYING DATA  Patient is Uli Fountain a 84 year old male. MRN is [id number]    Admitting physician: Mariela Singh MD  Primary care physician: Angela Ryan MD    CHIEF COMPLAINT  No chief complaint on file.            HISTORY OF PRESENT ILLNESS  84 year old male presents with bladder tumor.  His urine culture was positive in the office and his family confirmed that he has been taking his antibiotics as instructed.    PAST UROLOGICAL HISTORY BY ORGAN SYSTEM  See HPI    PAST MEDICAL HISTORY  Past Medical History[1]          PAST SURGICAL HISTORY  Past Surgical History[2]    PAST FAMILY HISTORY  Family History[3]    PAST SOCIAL HISTORY  Social History     Socioeconomic History    Marital status:      Spouse name: Not on file    Number of children: Not on file    Years of education: Not on file    Highest education level: Not on file   Occupational History    Not on file   Tobacco Use    Smoking status: Never    Smokeless tobacco: Never   Vaping Use    Vaping status: Never Used   Substance and Sexual Activity    Alcohol use: Not Currently    Drug use: Never    Sexual activity: Not on file   Other Topics Concern    Not on file   Social History Narrative    Not on file     Social Drivers of Health     Food Insecurity: Patient Declined (3/29/2025)    NCSS - Food Insecurity     Worried About Running Out of Food in the Last Year: Patient declined     Ran Out of Food in the Last Year: Patient declined   Transportation Needs: Patient Declined (3/29/2025)    NCSS - Transportation     Lack of Transportation: Patient declined   Housing Stability: Unknown (3/29/2025)    NCSS - Housing/Utilities     Has Housing: Yes     Worried About Losing Housing: Patient declined     Unable to Get Utilities: Patient declined       MEDICATIONS  Medications - Current[4]    ALLERGIES  Allergies[5]       REVIEW OF SYSTEMS  Constitutional symptoms:(-) fever, (-) chills (-)  headache  Eyes: (-) blurred vision, (-) double vision   Neurological: (-) tremors, (-) dizzy spells  (-) numbness/tingling  Endocrine: (-) feeling too hot/cold (-)  tired/sluggish  Gastrointestinal:(-)  abdominal pain   (-) nausea/vomiting (-) indigestion  Cardiovascular: (-) chest pain, (-) palpitations (-) HTN  Integumentary: (-)  skin rash (-) persistent itch  Musculoskeletal: (-) joint pain, (-) neck pain (-) back pain  Ear/Nose/Throat/Mouth:  (-) sore throat (-) sinus problems  Genitourinary:see HPI  Respiratory:  (-) problems  wheezing (-) frequent cough (-) SOB (-) QUILES  Hematologic/Lymphatic: (-)  swollen glands (-) blood clotting problem    PHYSICAL EXAMINATIONS    Vital Signs:   Vitals:    04/25/25 1224 04/28/25 0919   BP:  (!) 168/60   BP Location:  Right arm   Pulse:  56   Resp:  14   Temp:  98.1 °F (36.7 °C)   TempSrc:  Oral   SpO2:  98%   Weight: 181 lb (82.1 kg) 174 lb (78.9 kg)   Height: 5' 10\" (1.778 m)        Constitutional: General appearance: appears well, alert and oriented x 3, pleasant and cooperative.  Neuro: No gross deficits  Skin: Normal color, turgor  HEENT: Neck supple  Chest: Normal respiratory effort  CV: Normal radial pulse  Abdomen: Soft without tenderness, guarding. No suprapubic tenderness or fullness  No CVA tenderness bilaterally  Extremities: No edema appreciated.      REVIEW OF LABORATORY, PATHOLOGY, AND RADIOLOGY DATA    CBC w/DIF:   Lab Results   Component Value Date    WBC 4.9 04/01/2025    RBC 3.09 (L) 04/01/2025    HGB 9.4 (L) 04/01/2025    HCT 28.8 (L) 04/01/2025    MCV 93.2 04/01/2025    MCH 30.4 04/01/2025    MCHC 32.6 04/01/2025    RDW 14.5 04/01/2025    .0 04/01/2025       CMP:    Lab Results   Component Value Date    EGFR >60 09/18/2016       PSA:  No components found for: \"GPSAD\"    UA:  No components found for: \"GUCOL\", \"GUCLR\", \"GUSG\", \"GUPH\", \"GUPRO\", \"GUGLUC\", \"GUKET\", \"GUBLD\", \"GUBILI\", \"GUNITR\", \"GULEU\", \"GUURO\", \"GUWBC\", \"GURBC\", \"GUSQEP\", \"GUNSEP\",  \"GUBACT\", \"GUAMOR\", \"GUHC\"    Urine culture:  Reviewed    Imaging:  XR SMALL BOWEL SINGLE CONTRAST (CPT=74250)  Result Date: 3/30/2025  PROCEDURE: XR SMALL BOWEL SINGLE CONTRAST (CPT=74250)  COMPARISON: [facility], CT ABDOMEN + PELVIS (CONTRAST ONLY) (CPT=74177), 3/28/2025, 5:54 PM.  INDICATIONS: Evaluate small bowel obstruction.  TECHNIQUE: Portable Small bowel series was performed in the usual manner.     FINDINGS:   radiograph of the abdomen demonstrates moderate stool throughout the colon.  No air-filled dilated small bowel loops are seen.  Suture line of the left pelvis near the midline.   Degenerative changes of the spine and hip joints.    After the administration of enteric contents, contents is seen outlining the stomach, duodenum, and proximal jejunum.  The contrast clinically progresses to the remainder of the small bowel and is seen within the proximal colon by 30 minutes after the contrast administration.  Contrast is seen to propagates to the ascending colon near the hepatic flexure by the 60 minutes examination.  Small bowel loops are seen measuring up to 4.6 cm, distended by the enteric contrast.            CONCLUSION:   No evidence of high-grade small bowel obstruction.  The enteric contrast reaches the colon by 30 minutes.  Dilated small bowel loops measuring up to 4.6 cm.  May represent partial small bowel obstruction versus ileus.    Dictated by (CST): Noreen Greene MD on 3/30/2025 at 2:43 PM     Finalized by (CST): Noreen Greene MD on 3/30/2025 at 2:46 PM            ASSESSMENT AND PLAN   84 year old male presents with bladder tumor    To OR for TURBT, intravesical gemcitabine  Ancef 2g  Anticipate discharge home postop        Mariela Singh MD  [facility]   O: [phone]               [1]   Past Medical History:   Bipolar affective (HCC)    Cancer (HCC)    Coronary atherosclerosis    Diabetes (HCC)    Hearing impaired person, bilateral    High blood pressure     High cholesterol    Osteoarthritis    Senile paranoid dementia (HCC)   [2]   Past Surgical History:  Procedure Laterality Date    Cholecystectomy      Colectomy      HAD COLOSTOMY REVERSED   [3] History reviewed. No pertinent family history.  [4] No current outpatient medications on file.  [5] No Known Allergies

## 2025-04-28 NOTE — ANESTHESIA PROCEDURE NOTES
Airway  Date/Time: 4/28/2025 10:45 AM  Reason: Elective      General Information and Staff   Patient location during procedure: OR  Anesthesiologist: Brian Navarrete MD  Resident/CRNA: Tg Marlow CRNA  Performed: CRNA   Performed by: Tg Marlow CRNA  Authorized by: Brian Navarrete MD        Indications and Patient Condition  Indications for airway management: anesthesia  Sedation level: deep      Preoxygenated: yesPatient position: sniffing    Mask difficulty assessment: 1 - vent by mask (easily)    Final Airway Details    Final airway type: endotracheal airway    Successful airway: ETT  Cuffed: yes   Successful intubation technique: Video laryngoscopy  Facilitating devices/methods: intubating stylet  Endotracheal tube insertion site: oral  Blade type: Daniels MAC.  Blade size: #4  ETT size (mm): 7.5    Cormack-Lehane Classification: grade I - full view of glottis  Placement verified by: capnometry   Cuff volume (mL): 7  Measured from: gums  ETT to gums (cm): 22  Number of attempts at approach: 1    Additional Comments  Atraumatic.

## 2025-08-29 ENCOUNTER — LAB ENCOUNTER (OUTPATIENT)
Dept: LAB | Facility: HOSPITAL | Age: 85
End: 2025-08-29
Attending: UROLOGY

## 2025-08-29 DIAGNOSIS — N39.0 UTI (URINARY TRACT INFECTION): ICD-10-CM

## 2025-08-29 DIAGNOSIS — N32.89 BLADDER MASS: Primary | ICD-10-CM

## 2025-08-29 PROCEDURE — 87086 URINE CULTURE/COLONY COUNT: CPT

## (undated) DEVICE — SOLUTION IRRIG 3000ML 0.9% NACL FLX CONT

## (undated) DEVICE — UNIVERSAL STERIBUMP® STERILE (5/CASE): Brand: UNIVERSAL STERIBUMP®

## (undated) DEVICE — SUTURE MONOCRYL 3-0 PS-2

## (undated) DEVICE — SOL  .9 3000ML

## (undated) DEVICE — SUTURE VICRYL 0 CT-1

## (undated) DEVICE — 2T11 #2 PDO 36 X 36: Brand: 2T11 #2 PDO 36 X 36

## (undated) DEVICE — SOLUTION IRRIG 1000ML 0.9% NACL USP BTL

## (undated) DEVICE — SCD SLEEVE KNEE HI BLEND

## (undated) DEVICE — SUTURE VICRYL 2-0 CP-1

## (undated) DEVICE — GAMMEX® PI HYBRID SIZE 8, STERILE POWDER-FREE SURGICAL GLOVE, POLYISOPRENE AND NEOPRENE BLEND: Brand: GAMMEX

## (undated) DEVICE — STERILE POLYISOPRENE POWDER-FREE SURGICAL GLOVES: Brand: PROTEXIS

## (undated) DEVICE — PAD,NON-ADHERENT,3X8,STERILE,LF,1/PK: Brand: MEDLINE

## (undated) DEVICE — WRAP COOLING KNEE W/ICE PILLOW

## (undated) DEVICE — Device

## (undated) DEVICE — Device: Brand: STABLECUT®

## (undated) DEVICE — NEEDLE SPINAL 18X3-1/2 PINK.

## (undated) DEVICE — DRAPE,U/SHT,SPLIT,FILM,60X84,STERILE: Brand: MEDLINE

## (undated) DEVICE — CHLORAPREP 26ML APPLICATOR

## (undated) DEVICE — PACK CUSTOM CYSTO

## (undated) DEVICE — TOTAL KNEE CDS: Brand: MEDLINE INDUSTRIES, INC.

## (undated) DEVICE — SLEEVE COMPR MD KNEE LEN SGL USE KENDALL SCD

## (undated) DEVICE — SPECIMEN CONTAINER,POSITIVE SEAL INDICATOR, OR PACKAGED: Brand: PRECISION

## (undated) DEVICE — EXOFIN TISSUE ADHESIVE 1.0ML

## (undated) DEVICE — CONVERTORS STOCKINETTE: Brand: CONVERTORS

## (undated) DEVICE — MEDI-VAC NON-CONDUCTIVE SUCTION TUBING: Brand: CARDINAL HEALTH

## (undated) DEVICE — BIPOLAR SEALER 23-112-1 AQM 6.0: Brand: AQUAMANTYS™

## (undated) DEVICE — CATHETER URETH 18FR BLLN 5CC SIL ALLY W/ SIL

## (undated) DEVICE — CUTTING LOOP, BIPOLAR, 0.30MM, 24/26 FR.: Brand: N.A.

## (undated) DEVICE — BAG DRNGE 2000ML URIN INF CTRL ANTIREFLX

## (undated) DEVICE — REAMER BLADE PATELLA 29

## (undated) DEVICE — SOLUTION IRRIG 1000ML ST H2O AQUALITE PLAS

## (undated) DEVICE — SYRINGE 30ML LL TIP

## (undated) DEVICE — APPX 0.91 ML, GRADUATED ADAPTER WITH CLAVE®: Brand: ICU MEDICAL

## (undated) DEVICE — HOOD, PEEL-AWAY: Brand: FLYTE

## (undated) DEVICE — GAUZE SPONGES,12 PLY: Brand: CURITY

## (undated) DEVICE — Device: Brand: POWER-FLO®

## (undated) DEVICE — ALCOHOL 70% 4 OZ

## (undated) DEVICE — HOOD: Brand: FLYTE

## (undated) DEVICE — STERILE SYNTHETIC POLYISOPRENE POWDER-FREE SURGICAL GLOVES WITH HYDROGEL COATING, SMOOTH FINISH, STRAIGHT FINGER: Brand: PROTEXIS

## (undated) NOTE — LETTER
OUTSIDE TESTING RESULT REQUEST     IMPORTANT: FOR YOUR IMMEDIATE ATTENTION  Please FAX all test results listed below to: [phone]     Testing already done on or about: Appointment with you 9/29/2021    * * * * If testing is NOT complete, arrange with p

## (undated) NOTE — LETTER
OUTSIDE TESTING RESULT REQUEST     IMPORTANT: FOR YOUR IMMEDIATE ATTENTION  Please FAX all test results listed below to: [phone]     Testing already done on or about: Appointment with you 10/5/2021    * * * * If testing is NOT complete, arrange with p